# Patient Record
Sex: MALE | Race: WHITE | Employment: OTHER | URBAN - METROPOLITAN AREA
[De-identification: names, ages, dates, MRNs, and addresses within clinical notes are randomized per-mention and may not be internally consistent; named-entity substitution may affect disease eponyms.]

---

## 2017-02-25 DIAGNOSIS — E78.5 HYPERLIPIDEMIA: ICD-10-CM

## 2017-03-01 RX ORDER — PRAVASTATIN SODIUM 10 MG
TABLET ORAL
Qty: 90 TABLET | Refills: 3 | Status: SHIPPED | OUTPATIENT
Start: 2017-03-01 | End: 2017-09-21 | Stop reason: SDUPTHER

## 2017-09-21 ENCOUNTER — OFFICE VISIT (OUTPATIENT)
Dept: INTERNAL MEDICINE CLINIC | Age: 62
End: 2017-09-21
Payer: COMMERCIAL

## 2017-09-21 VITALS
SYSTOLIC BLOOD PRESSURE: 118 MMHG | WEIGHT: 173 LBS | BODY MASS INDEX: 26.22 KG/M2 | DIASTOLIC BLOOD PRESSURE: 72 MMHG | HEIGHT: 68 IN

## 2017-09-21 DIAGNOSIS — Z00.00 ROUTINE MEDICAL EXAM: Primary | ICD-10-CM

## 2017-09-21 PROBLEM — Z85.46 HISTORY OF PROSTATE CANCER: Status: ACTIVE | Noted: 2017-09-21

## 2017-09-21 PROCEDURE — 99396 PREV VISIT EST AGE 40-64: CPT | Performed by: INTERNAL MEDICINE

## 2017-09-21 RX ORDER — PRAVASTATIN SODIUM 10 MG
20 TABLET ORAL DAILY
Qty: 90 TABLET | Refills: 3 | Status: SHIPPED | OUTPATIENT
Start: 2017-09-21 | End: 2017-09-22 | Stop reason: SDUPTHER

## 2017-09-21 RX ORDER — MELOXICAM 15 MG/1
15 TABLET ORAL DAILY
Qty: 90 TABLET | Refills: 1 | Status: SHIPPED | OUTPATIENT
Start: 2017-09-21 | End: 2018-02-16 | Stop reason: SDUPTHER

## 2017-09-21 ASSESSMENT — PATIENT HEALTH QUESTIONNAIRE - PHQ9
2. FEELING DOWN, DEPRESSED OR HOPELESS: 0
SUM OF ALL RESPONSES TO PHQ QUESTIONS 1-9: 0
SUM OF ALL RESPONSES TO PHQ9 QUESTIONS 1 & 2: 0
1. LITTLE INTEREST OR PLEASURE IN DOING THINGS: 0

## 2017-09-22 DIAGNOSIS — E78.2 MIXED HYPERLIPIDEMIA: ICD-10-CM

## 2017-09-22 DIAGNOSIS — M19.90 ARTHRITIS: ICD-10-CM

## 2017-09-22 RX ORDER — PRAVASTATIN SODIUM 20 MG
20 TABLET ORAL DAILY
Qty: 90 TABLET | Refills: 3 | Status: SHIPPED | OUTPATIENT
Start: 2017-09-22 | End: 2018-03-20

## 2017-10-04 ENCOUNTER — TELEPHONE (OUTPATIENT)
Dept: INTERNAL MEDICINE CLINIC | Age: 62
End: 2017-10-04

## 2017-10-24 LAB
ABSOLUTE BASO #: 0.1 K/UL (ref 0–0.1)
ABSOLUTE EOS #: 0.2 K/UL (ref 0.1–0.4)
ABSOLUTE LYMPH #: 1.3 K/UL (ref 0.8–5.2)
ABSOLUTE MONO #: 0.4 K/UL (ref 0.1–0.9)
ABSOLUTE NEUT #: 3 K/UL (ref 1.3–9.1)
BASOPHILS RELATIVE PERCENT: 1.2 %
EOSINOPHILS RELATIVE PERCENT: 4.3 %
HCT VFR BLD CALC: 46.2 % (ref 41.4–51)
HEMOGLOBIN: 15.5 G/DL (ref 13.8–17)
LYMPHOCYTE %: 26.4 %
MCH RBC QN AUTO: 28.4 PG (ref 27–34)
MCHC RBC AUTO-ENTMCNC: 33.5 G/DL (ref 31–36)
MCV RBC AUTO: 84.8 FL (ref 80–100)
MONOCYTES # BLD: 7.9 %
NEUTROPHILS RELATIVE PERCENT: 60 %
PDW BLD-RTO: 12.8 % (ref 10.8–14.8)
PLATELETS: 181 K/UL (ref 150–450)
RBC: 5.45 M/UL (ref 4–5.5)
WBC: 4.9 K/UL (ref 3.7–10.8)

## 2017-10-25 ENCOUNTER — PATIENT MESSAGE (OUTPATIENT)
Dept: INTERNAL MEDICINE CLINIC | Age: 62
End: 2017-10-25

## 2017-10-25 LAB
ALBUMIN SERPL-MCNC: 4.4 G/DL (ref 3.2–5.3)
ALK PHOSPHATASE: 42 IU/L (ref 35–121)
ALT SERPL-CCNC: 17 IU/L (ref 5–59)
ANION GAP SERPL CALCULATED.3IONS-SCNC: 12 MMOL/L
APPEARANCE: CLEAR
AST SERPL-CCNC: 14 IU/L (ref 10–42)
BILIRUB SERPL-MCNC: 0.8 MG/DL (ref 0.2–1.3)
BILIRUBIN: NEGATIVE
BUN BLDV-MCNC: 12 MG/DL (ref 10–20)
CALCIUM SERPL-MCNC: 9.2 MG/DL (ref 8.7–10.8)
CHLORIDE BLD-SCNC: 105 MMOL/L (ref 95–111)
CHOLESTEROL/HDL RATIO: 4.5
CHOLESTEROL: 193 MG/DL
CO2: 29 MMOL/L (ref 21–32)
COLOR: YELLOW
CREAT SERPL-MCNC: 1 MG/DL (ref 0.5–1.3)
EGFR AFRICAN AMERICAN: 92
EGFR IF NONAFRICAN AMERICAN: 76
GLUCOSE BLD-MCNC: NEGATIVE MG/DL
GLUCOSE: 83 MG/DL (ref 70–100)
HDLC SERPL-MCNC: 43 MG/DL (ref 40–60)
KETONES, URINE: NEGATIVE
LDL CHOLESTEROL CALCULATED: 116 MG/DL
LDL/HDL RATIO: 2.7
LEUKOCYTE ESTERASE, URINE: NEGATIVE
NITRITE, URINE: NEGATIVE
OCCULT BLOOD,URINE: NEGATIVE
PH: 7 (ref 5–9)
POTASSIUM SERPL-SCNC: 4.6 MMOL/L (ref 3.5–5.4)
PROTEIN, URINE: NEGATIVE
PSA, ULTRASENSITIVE: 0.95 NG/ML
SODIUM BLD-SCNC: 141 MMOL/L (ref 134–147)
SP GRAVITY MISCELLANEOUS: 1.01 (ref 1–1.03)
TOTAL CK: 69 IU/L (ref 0–236)
TOTAL PROTEIN: 6.7 G/DL (ref 5.8–8)
TRIGL SERPL-MCNC: 171 MG/DL
UROBILINOGEN, URINE: NORMAL
VLDLC SERPL CALC-MCNC: 34 MG/DL

## 2017-10-26 ENCOUNTER — PATIENT MESSAGE (OUTPATIENT)
Dept: INTERNAL MEDICINE CLINIC | Age: 62
End: 2017-10-26

## 2018-01-15 DIAGNOSIS — Z12.11 COLON CANCER SCREENING: ICD-10-CM

## 2018-01-15 DIAGNOSIS — Z12.11 COLON CANCER SCREENING: Primary | ICD-10-CM

## 2018-01-15 LAB
CONTROL: PRESENT
HEMOCCULT STL QL: NEGATIVE

## 2018-01-15 PROCEDURE — 82274 ASSAY TEST FOR BLOOD FECAL: CPT | Performed by: INTERNAL MEDICINE

## 2018-02-16 DIAGNOSIS — Z00.00 ROUTINE MEDICAL EXAM: ICD-10-CM

## 2018-02-21 RX ORDER — MELOXICAM 15 MG/1
TABLET ORAL
Qty: 90 TABLET | Refills: 1 | Status: SHIPPED | OUTPATIENT
Start: 2018-02-21 | End: 2018-10-25 | Stop reason: SDUPTHER

## 2018-03-20 ENCOUNTER — OFFICE VISIT (OUTPATIENT)
Dept: INTERNAL MEDICINE CLINIC | Age: 63
End: 2018-03-20
Payer: COMMERCIAL

## 2018-03-20 VITALS
DIASTOLIC BLOOD PRESSURE: 86 MMHG | SYSTOLIC BLOOD PRESSURE: 153 MMHG | WEIGHT: 174 LBS | BODY MASS INDEX: 26.37 KG/M2 | HEIGHT: 68 IN

## 2018-03-20 DIAGNOSIS — E78.5 HYPERLIPIDEMIA, UNSPECIFIED HYPERLIPIDEMIA TYPE: ICD-10-CM

## 2018-03-20 DIAGNOSIS — H91.93 PARTIAL DEAFNESS OF BOTH EARS: ICD-10-CM

## 2018-03-20 DIAGNOSIS — Z00.00 WELL ADULT EXAM: Primary | ICD-10-CM

## 2018-03-20 DIAGNOSIS — Z00.00 ROUTINE GENERAL MEDICAL EXAMINATION AT A HEALTH CARE FACILITY: ICD-10-CM

## 2018-03-20 DIAGNOSIS — Z85.46 HISTORY OF PROSTATE CANCER: ICD-10-CM

## 2018-03-20 DIAGNOSIS — M19.90 ARTHRITIS: ICD-10-CM

## 2018-03-20 PROCEDURE — 99396 PREV VISIT EST AGE 40-64: CPT | Performed by: INTERNAL MEDICINE

## 2018-03-20 NOTE — PROGRESS NOTES
Visit Information    Have you changed or started any medications since your last visit including any over-the-counter medicines, vitamins, or herbal medicines? no   Are you having any side effects from any of your medications? -  no  Have you stopped taking any of your medications? Is so, why? -  no    Have you seen any other physician or provider since your last visit? No  Have you had any other diagnostic tests since your last visit? No  Have you been seen in the emergency room and/or had an admission to a hospital since we last saw you? No  Have you had your routine dental cleaning in the past 6 months? no    Have you activated your [employer] account? If not, what are your barriers?  Yes     Patient Care Team:  Josue Hernandez MD as PCP - General    Medical History Review  Past Medical, Family, and Social History reviewed and does contribute to the patient presenting condition    Health Maintenance   Topic Date Due    Hepatitis C screen  1955    HIV screen  09/28/1970    DTaP/Tdap/Td vaccine (1 - Tdap) 09/28/1974    Shingles Vaccine (1 of 2 - 2 Dose Series) 09/28/2005    Flu vaccine (1) 09/01/2017    Colon Cancer Screen FIT/FOBT  01/15/2019    Lipid screen  10/24/2022

## 2018-03-22 LAB
ALBUMIN SERPL-MCNC: 4.5 G/DL (ref 3.5–5.2)
ALK PHOSPHATASE: 46 U/L (ref 39–118)
ALT SERPL-CCNC: 18 U/L (ref 5–50)
ANION GAP SERPL CALCULATED.3IONS-SCNC: 12 MEQ/L (ref 10–19)
AST SERPL-CCNC: 15 U/L (ref 9–50)
BILIRUB SERPL-MCNC: 0.6 MG/DL
BUN BLDV-MCNC: 11 MG/DL (ref 8–23)
CALCIUM SERPL-MCNC: 9.2 MG/DL (ref 8.5–10.5)
CHLORIDE BLD-SCNC: 105 MEQ/L (ref 95–107)
CHOLESTEROL/HDL RATIO: 4.8
CHOLESTEROL: 208 MG/DL
CO2: 25 MEQ/L (ref 19–31)
CREAT SERPL-MCNC: 0.9 MG/DL (ref 0.8–1.4)
EGFR AFRICAN AMERICAN: 105.7 ML/MIN/1.73 M2
EGFR IF NONAFRICAN AMERICAN: 91.2 ML/MIN/1.73 M2
GLUCOSE: 85 MG/DL (ref 70–99)
HDLC SERPL-MCNC: 43 MG/DL
LDL CHOLESTEROL CALCULATED: 145 MG/DL
LDL/HDL RATIO: 3.4
POTASSIUM SERPL-SCNC: 4.5 MEQ/L (ref 3.5–5.4)
PSA, ULTRASENSITIVE: 0.84 NG/ML
SODIUM BLD-SCNC: 142 MEQ/L (ref 135–146)
TOTAL PROTEIN: 6.5 G/DL (ref 6.1–8.3)
TRIGL SERPL-MCNC: 102 MG/DL
VLDLC SERPL CALC-MCNC: 20 MG/DL

## 2018-03-23 LAB
ABSOLUTE BASO #: 0 K/UL (ref 0–0.1)
ABSOLUTE EOS #: 0.2 K/UL (ref 0.1–0.4)
ABSOLUTE LYMPH #: 1.1 K/UL (ref 0.8–5.2)
ABSOLUTE MONO #: 0.4 K/UL (ref 0.1–0.9)
ABSOLUTE NEUT #: 1.8 K/UL (ref 1.3–9.1)
AMORPHOUS URATE: NORMAL
APPEARANCE: NORMAL
BACTERIA: NEGATIVE PER HPF
BASOPHILS RELATIVE PERCENT: 1.1 %
BILIRUBIN: NEGATIVE
CALCIUM OXALATE: NORMAL
COLOR: YELLOW
EOSINOPHILS RELATIVE PERCENT: 6.2 %
EPITHELIAL CELLS: 0 PER HPF
GLUCOSE BLD-MCNC: NEGATIVE MG/DL
HCT VFR BLD CALC: 43.9 % (ref 41.4–51)
HEMOGLOBIN: 15.2 G/DL (ref 13.8–17)
KETONES, URINE: NEGATIVE
LEUKOCYTE ESTERASE, URINE: NEGATIVE
LYMPHOCYTE %: 32.3 %
MCH RBC QN AUTO: 29.7 PG (ref 27–34)
MCHC RBC AUTO-ENTMCNC: 34.6 G/DL (ref 31–36)
MCV RBC AUTO: 85.7 FL (ref 80–100)
MONOCYTES # BLD: 10.2 %
NEUTROPHILS RELATIVE PERCENT: 49.9 %
NITRITE, URINE: NEGATIVE
OCCULT BLOOD,URINE: NEGATIVE
PDW BLD-RTO: 12.9 % (ref 10.8–14.8)
PH: 5.5 (ref 5–9)
PLATELETS: 196 K/UL (ref 150–450)
PROTEIN, URINE: NEGATIVE
RBC: 0 PER HPF (ref 0–5)
RBC: 5.12 M/UL (ref 4–5.5)
SP GRAVITY MISCELLANEOUS: 1.02 (ref 1–1.03)
UROBILINOGEN, URINE: NORMAL
WBC: 0 PER HPF (ref 0–5)
WBC: 3.5 K/UL (ref 3.7–10.8)

## 2018-03-31 ENCOUNTER — PATIENT MESSAGE (OUTPATIENT)
Dept: INTERNAL MEDICINE CLINIC | Age: 63
End: 2018-03-31

## 2018-10-25 DIAGNOSIS — Z00.00 ROUTINE MEDICAL EXAM: ICD-10-CM

## 2018-10-25 RX ORDER — MELOXICAM 15 MG/1
15 TABLET ORAL DAILY
Qty: 90 TABLET | Refills: 1 | Status: SHIPPED | OUTPATIENT
Start: 2018-10-25 | End: 2018-10-26 | Stop reason: SDUPTHER

## 2018-10-26 DIAGNOSIS — Z00.00 ROUTINE MEDICAL EXAM: ICD-10-CM

## 2018-10-26 RX ORDER — MELOXICAM 15 MG/1
15 TABLET ORAL DAILY
Qty: 90 TABLET | Refills: 1 | Status: SHIPPED | OUTPATIENT
Start: 2018-10-26 | End: 2019-11-19 | Stop reason: SDUPTHER

## 2019-11-19 DIAGNOSIS — Z00.00 ROUTINE MEDICAL EXAM: ICD-10-CM

## 2019-11-20 RX ORDER — MELOXICAM 15 MG/1
15 TABLET ORAL DAILY
Qty: 30 TABLET | Refills: 0 | Status: SHIPPED | OUTPATIENT
Start: 2019-11-20 | End: 2020-10-15 | Stop reason: SDUPTHER

## 2020-07-13 RX ORDER — MELOXICAM 15 MG/1
15 TABLET ORAL DAILY
Qty: 30 TABLET | Refills: 0 | OUTPATIENT
Start: 2020-07-13

## 2020-07-31 RX ORDER — MELOXICAM 15 MG/1
15 TABLET ORAL DAILY
Qty: 90 TABLET | Refills: 0 | Status: CANCELLED | OUTPATIENT
Start: 2020-07-31

## 2020-10-15 ENCOUNTER — OFFICE VISIT (OUTPATIENT)
Dept: INTERNAL MEDICINE CLINIC | Age: 65
End: 2020-10-15
Payer: MEDICARE

## 2020-10-15 VITALS
OXYGEN SATURATION: 96 % | BODY MASS INDEX: 26.37 KG/M2 | SYSTOLIC BLOOD PRESSURE: 130 MMHG | HEART RATE: 76 BPM | TEMPERATURE: 96 F | DIASTOLIC BLOOD PRESSURE: 84 MMHG | WEIGHT: 174 LBS

## 2020-10-15 PROBLEM — L98.9 SKIN LESION: Status: ACTIVE | Noted: 2020-10-15

## 2020-10-15 PROCEDURE — 99214 OFFICE O/P EST MOD 30 MIN: CPT | Performed by: INTERNAL MEDICINE

## 2020-10-15 RX ORDER — MELOXICAM 15 MG/1
15 TABLET ORAL DAILY
Qty: 90 TABLET | Refills: 1 | Status: ON HOLD | OUTPATIENT
Start: 2020-10-15 | End: 2022-01-11 | Stop reason: HOSPADM

## 2020-10-15 ASSESSMENT — PATIENT HEALTH QUESTIONNAIRE - PHQ9
SUM OF ALL RESPONSES TO PHQ QUESTIONS 1-9: 0
SUM OF ALL RESPONSES TO PHQ9 QUESTIONS 1 & 2: 0
1. LITTLE INTEREST OR PLEASURE IN DOING THINGS: 0
SUM OF ALL RESPONSES TO PHQ QUESTIONS 1-9: 0
2. FEELING DOWN, DEPRESSED OR HOPELESS: 0
SUM OF ALL RESPONSES TO PHQ QUESTIONS 1-9: 0

## 2020-10-15 NOTE — PROGRESS NOTES
OFFICE VISIT  PROGRESS NOTE         Date of patient's visit: 10/21/20  Patient's Name:  Bhavana Hameed  YOB: 1955       Patient Care Team:  Diane Vasquez MD as PCP - Lex Cowden, MD as PCP - [facility] Empaneled Provider        SUBJECTIVE     HISTORY           History of present illness     Pertinent details  added to ,       Chief Complaint   Patient presents with    Annual Exam    Knee Pain     left knee pain, ACL detaiched diagnosed 25 years ago    Other     lump on right hand          Encounter Diagnoses   Name Primary?  Screening for malignant neoplasm of colon Yes    Routine medical exam     Mixed hyperlipidemia     Arthritis     History of prostate cancer     Partial deafness of both ears     Skin lesion rt hand wart     Prostate cancer screening         Symptom / problem          --history obtained from patient.-   Lorna is here for a periodic visit to evaluate his chronic problems   He was diagnosed to have prostate cancer and had radiation therapy and is in remission   He does have partial deafness of both ears which has been stable  He is complaining of left knee pain  History of sprain in the past  He was offered an evaluation by orthopedic he wants to wait  He does take meloxicam for arthritis     MEDICATIONS:      Current Outpatient Medications   Medication Sig Dispense Refill    meloxicam (MOBIC) 15 MG tablet Take 1 tablet by mouth daily 90 tablet 1     No current facility-administered medications for this visit. ALLERGIES:      Allergies   Allergen Reactions    Pravastatin Other (See Comments)     Myalgias         SOCIAL HISTORY   Reviewed and no change from previous record. Emilio Santiago  reports that he has never smoked.  He has never used smokeless tobacco.    FAMILY HISTORY:    Reviewed and No change from previous visit    REVIEW OF SYSTEMS:    Review of Systems   Respiratory: Negative for shortness of breath. Cardiovascular: Negative for chest pain, palpitations and leg swelling. Gastrointestinal: Negative for abdominal pain. Musculoskeletal: Negative for myalgias. Skin: Negative for rash. OBJECTIVE      Physical exam           Vitals:    10/15/20 1438   BP: 130/84   Site: Right Upper Arm   Pulse: 76   Temp: 96 °F (35.6 °C)   SpO2: 96%   Weight: 174 lb (78.9 kg)         Physical Exam   Vitals:  /84 (Site: Right Upper Arm)   Pulse 76   Temp 96 °F (35.6 °C)   Wt 174 lb (78.9 kg)   SpO2 96%   BMI 26.37 kg/m²                 Body mass index is 26.37 kg/m².      Vitals:    10/15/20 1438   BP: 130/84   Site: Right Upper Arm   Pulse: 76   Temp: 96 °F (35.6 °C)   SpO2: 96%   Weight: 174 lb (78.9 kg)       General -alert, well appearing, and in no distress  Skin - normal coloration and turgor, no rashes,no suspicious skin lesions noted  Eyes - pupils equal and reactive, extraocular eye movementsintact  Ears - bilateral TM's and external ear canals normal  Nose - normal and patent, no erythema, discharge or polyps  Mouth - mucous membranes are moist, pharynx normal without lesions  Neck - supple, no significant adenopathy  Lymphatics - no palpable lymphadenopathy, no hepatosplenomegaly    Chest - clear to auscultation, no wheezes, rales or rhonchi, symmetric air entry    Heart - normal rate, regular rhythm, no murmurs, rubs, clicks or gallops    Extremities - peripheral pulses normal, no pedal edema       Abdomen - soft, nontender, nondistended, no masses or organomegaly    Back - full range of motion, notenderness, palpable spasm or pain on motion    Neurological - alert, oriented, normal speech, no focal sensory or motor deficit  Musculoskeletal - no joint tenderness, deformity or swelling        Left knee arthritis        DIAGNOSTICS / INSERTS / IMAGES    [x] Reviewed       Labs      URINE ANALYSIS: No results found for: LABURIN     CBC:  Lab Results   Component Value Date    WBC 3.5 03/22/2018    WBC 0 03/22/2018    WBC 4.3 05/14/2015    HGB 15.2 03/22/2018     03/22/2018     05/14/2015     10/31/2011        BMP:    Lab Results   Component Value Date     10/16/2020    K 4.6 10/16/2020     10/16/2020    CO2 22 10/16/2020    BUN 15 10/16/2020    CREATININE 0.78 10/16/2020    GLUCOSE 84 10/16/2020    GLUCOSE 85 03/22/2018        No components found for: LDLC  No results found for: LABA1C  No results found for: POCGLU   .     LIVER PROFILE:  Lab Results   Component Value Date    ALT 20 10/16/2020    AST 16 10/16/2020    PROT 6.6 10/16/2020    BILITOT 0.46 10/16/2020    BILITOT NEGATIVE 03/22/2018    LABALBU 4.3 10/16/2020    LABALBU 4.6 10/31/2011          Results for orders placed or performed in visit on 03/20/18   Lipid Panel   Result Value Ref Range    Cholesterol 208 (H) <200 mg/dL    Triglycerides 102 <150 mg/dL    HDL 43 >39 mg/dL    LDL Calculated 145 (H) <130 mg/dL    VLDL Cholesterol Calculated 20 <30 mg/dL    LDl/HDL Ratio 3.4 <3.5    Chol/HDL Ratio 4.8 <5   Comprehensive Metabolic Panel   Result Value Ref Range    Glucose 85 70 - 99 mg/dL    BUN 11 8 - 23 mg/dL    CREATININE 0.9 0.8 - 1.4 mg/dL    eGFR African American 105.7 >59 mL/min/1.73 m2    EGFR IF NonAfrican American 91.2 >59 mL/min/1.73 m2    Calcium 9.2 8.5 - 10.5 mg/dL    Sodium 142 135 - 146 mEq/L    Potassium 4.5 3.5 - 5.4 mEq/L    Chloride 105 95 - 107 mEq/L    CO2 25 19 - 31 mEq/L    Anion Gap 12 10 - 19 mEq/L    Total Bilirubin 0.6 <1.3 mg/dL    Alk Phosphatase 46 39 - 118 U/L    AST 15 9 - 50 U/L    ALT 18 5 - 50 U/L    Total Protein 6.5 6.1 - 8.3 g/dL    Alb 4.5 3.5 - 5.2 g/dL   CBC   Result Value Ref Range    WBC 3.5 (L) 3.7 - 10.8 K/ul    RBC 5.12 4.00 - 5.50 M/ul    Hemoglobin 15.2 13.8 - 17.0 g/dL    Hematocrit 43.9 41.4 - 51.0 % deafness of both ears  Stable  Skin lesion rt hand wart  Is a small viral wart right hand  Prostate cancer screening  -     Psa screening; Future               SALIENT  ACTIONS TODAYS VISIT       Today's office visit SALIENT ACTIONS    ----            Discussed use, benefit, and side effects of prescribed medications. [x] yes    All patient questions answered. Patient voiced understanding. Patient given educational materials - see patient instructions  [] yes         Medications Discontinued During This Encounter   Medication Reason    meloxicam (MOBIC) 15 MG tablet REORDER        Orders Placed This Encounter   Procedures    Cologuard (For External Results Only)     This test is performed by an external laboratory and is used for result attachment only. It is required that this order requisition be faxed to: [facility] @ [phone]. See [email or web address] for further information. Standing Status:   Future     Standing Expiration Date:   10/15/2021    Comprehensive Metabolic Panel     Standing Status:   Future     Number of Occurrences:   1     Standing Expiration Date:   10/15/2021    Lipid Panel     Standing Status:   Future     Number of Occurrences:   1     Standing Expiration Date:   10/15/2021     Order Specific Question:   Is Patient Fasting?/# of Hours     Answer:   8    Psa screening     Standing Status:   Future     Number of Occurrences:   1     Standing Expiration Date:   10/15/2021    Urinalysis     Standing Status:   Future     Number of Occurrences:   1     Standing Expiration Date:   10/15/2021   Claudis Peabody, MD, Orthopedic Surgery, [address] [place] [address]     Referral Priority:   Routine     Referral Type:   Eval and Treat     Referral Reason:   Specialty Services Required     Referred to Provider:   Destiny Purcell MD     Requested Specialty:   Orthopedic Surgery     Number of Visits Requested:   1        There are no Patient Instructions on file for this visit. Medication List          Accurate as of October 15, 2020 11:59 PM. If you have any questions, ask your nurse or doctor. CONTINUE taking these medications    meloxicam 15 MG tablet  Commonly known as:  MOBIC  Take 1 tablet by mouth daily           Where to Get Your Medications      These medications were sent to [facility]  [address] [place] [address]    Phone:  [phone] ·   meloxicam 15 MG tablet             FOLLOW UP  PLANS     No follow-ups on file. MD QUIN Flanagan [facility]  [address] Phone [phone]   Fax: [phone]  Answering Service: [phone]  Visit Information    Have you changed or started any medications since your last visit including any over-the-counter medicines, vitamins, or herbal medicines? no   Are you having any side effects from any of your medications? -  no  Have you stopped taking any of your medications? Is so, why? -  no    Have you seen any other physician or provider since your last visit? No  Have you had any other diagnostic tests since your last visit? No  Have you been seen in the emergency room and/or had an admission to a hospital since we last saw you? No  Have you had your routine dental cleaning in the past 6 months? yes -     Have you activated your [employer] account? If not, what are your barriers?  Yes     Patient Care Team:  Rasheed Weaver MD as PCP - Julianna David MD as PCP - [facility] Provider    Medical History Review  Past Medical, Family, and Social History reviewed and does not contribute to the patient presenting condition    Health Maintenance   Topic Date Due    Hepatitis C screen  1955    HIV screen  09/28/1970    DTaP/Tdap/Td vaccine (1 - Tdap) 09/28/1974    Shingles Vaccine (1 of 2) 09/28/2005    Colon Cancer Screen FIT/FOBT  01/15/2019    PSA counseling  03/22/2019    Flu vaccine (1) 09/01/2020    Pneumococcal 65+ years Vaccine (1 of 1 - PPSV23) 09/28/2020    Lipid screen  03/22/2023    Hepatitis A vaccine  Aged Out    Hepatitis B vaccine  Aged Out    Hib vaccine  Aged Out    Meningococcal (ACWY) vaccine  Aged Out

## 2020-10-16 ENCOUNTER — HOSPITAL ENCOUNTER (OUTPATIENT)
Age: 65
Setting detail: SPECIMEN
Discharge: HOME | End: 2020-10-16
Payer: MEDICARE

## 2020-10-16 LAB
ALBUMIN SERPL-MCNC: 4.3 G/DL (ref 3.5–5.2)
ALBUMIN/GLOBULIN RATIO: 1.9 (ref 1–2.5)
ALP BLD-CCNC: 45 U/L (ref 40–129)
ALT SERPL-CCNC: 20 U/L (ref 5–41)
ANION GAP SERPL CALCULATED.3IONS-SCNC: 14 MMOL/L (ref 9–17)
AST SERPL-CCNC: 16 U/L
BILIRUB SERPL-MCNC: 0.46 MG/DL (ref 0.3–1.2)
BILIRUBIN URINE: NEGATIVE
BUN BLDV-MCNC: 15 MG/DL (ref 8–23)
BUN/CREAT BLD: NORMAL (ref 9–20)
CALCIUM SERPL-MCNC: 9.1 MG/DL (ref 8.6–10.4)
CHLORIDE BLD-SCNC: 105 MMOL/L (ref 98–107)
CHOLESTEROL/HDL RATIO: 4.3
CHOLESTEROL: 203 MG/DL
CO2: 22 MMOL/L (ref 20–31)
COLOR: YELLOW
COMMENT UA: NORMAL
CREAT SERPL-MCNC: 0.78 MG/DL (ref 0.7–1.2)
GFR AFRICAN AMERICAN: >60 ML/MIN
GFR NON-AFRICAN AMERICAN: >60 ML/MIN
GFR SERPL CREATININE-BSD FRML MDRD: NORMAL ML/MIN/{1.73_M2}
GFR SERPL CREATININE-BSD FRML MDRD: NORMAL ML/MIN/{1.73_M2}
GLUCOSE BLD-MCNC: 84 MG/DL (ref 70–99)
GLUCOSE URINE: NEGATIVE
HDLC SERPL-MCNC: 47 MG/DL
KETONES, URINE: NEGATIVE
LDL CHOLESTEROL: 132 MG/DL (ref 0–130)
LEUKOCYTE ESTERASE, URINE: NEGATIVE
NITRITE, URINE: NEGATIVE
PH UA: 8 (ref 5–8)
POTASSIUM SERPL-SCNC: 4.6 MMOL/L (ref 3.7–5.3)
PROSTATE SPECIFIC ANTIGEN: 0.75 UG/L
PROTEIN UA: NEGATIVE
SODIUM BLD-SCNC: 141 MMOL/L (ref 135–144)
SPECIFIC GRAVITY UA: 1.01 (ref 1–1.03)
TOTAL PROTEIN: 6.6 G/DL (ref 6.4–8.3)
TRIGL SERPL-MCNC: 122 MG/DL
TURBIDITY: CLEAR
URINE HGB: NEGATIVE
UROBILINOGEN, URINE: NORMAL
VLDLC SERPL CALC-MCNC: ABNORMAL MG/DL (ref 1–30)

## 2020-10-21 ASSESSMENT — ENCOUNTER SYMPTOMS
SHORTNESS OF BREATH: 0
ABDOMINAL PAIN: 0

## 2020-10-27 ENCOUNTER — OFFICE VISIT (OUTPATIENT)
Dept: ORTHOPEDIC SURGERY | Age: 65
End: 2020-10-27
Payer: MEDICARE

## 2020-10-27 VITALS — BODY MASS INDEX: 25.01 KG/M2 | TEMPERATURE: 97.3 F | HEIGHT: 68 IN | WEIGHT: 165 LBS

## 2020-10-27 PROCEDURE — 20610 DRAIN/INJ JOINT/BURSA W/O US: CPT | Performed by: ORTHOPAEDIC SURGERY

## 2020-10-27 PROCEDURE — 99203 OFFICE O/P NEW LOW 30 MIN: CPT | Performed by: ORTHOPAEDIC SURGERY

## 2020-10-27 RX ORDER — LIDOCAINE HYDROCHLORIDE 10 MG/ML
4 INJECTION, SOLUTION INFILTRATION; PERINEURAL ONCE
Status: COMPLETED | OUTPATIENT
Start: 2020-10-27 | End: 2020-10-27

## 2020-10-27 RX ORDER — TRIAMCINOLONE ACETONIDE 40 MG/ML
40 INJECTION, SUSPENSION INTRA-ARTICULAR; INTRAMUSCULAR ONCE
Status: COMPLETED | OUTPATIENT
Start: 2020-10-27 | End: 2020-10-27

## 2020-10-27 RX ADMIN — LIDOCAINE HYDROCHLORIDE 4 ML: 10 INJECTION, SOLUTION INFILTRATION; PERINEURAL at 15:40

## 2020-10-27 RX ADMIN — TRIAMCINOLONE ACETONIDE 40 MG: 40 INJECTION, SUSPENSION INTRA-ARTICULAR; INTRAMUSCULAR at 15:40

## 2020-10-27 NOTE — LETTER
10/27/2020    Carol Fox [address]  [address] [facility], [address] [place] [address]    RE: Tonny Lanier    Dear Dr. Susan Ivy,    Thank you for allowing me to participate in the care of Mr. Madai Snyder. I had the opportunity to evaluate the patient on 10/27/2020. Attached you will find my evaluation and recommendations. Thanks again for the confidence you have expressed in me by allowing my participation in the care of your patient. I will keep you apprised of further developments in the patients treatment course as it progresses. If I can be of further assistance in any fashion, please feel free to contact me at your convenience.     Sincerely,        Yoana Araujo  Shoulder and Elbow Surgery

## 2020-10-27 NOTE — PROGRESS NOTES
Orthopedic Knee Encounter Note     Chief complaint: Left knee pain    HPI: Tonny Lanier is a 72 y.o. male who presents for evaluation of his left knee. About 25 to 30 years ago indicates that he tore his ACL. He was given the option of having a reconstruction or not and he chose not to have a reconstruction but he did have his knee scope and the meniscal tear addressed at that time. Ever since he states that he has had some instability in this knee but it appears that it is worsened over the course of the past several years. He remains very active hiking, walking, riding offroad motorcycles as well as participating in competitive shooting and with all these activities he does have pain as well as instability in his knee. He has a hard time localizing his knee pain indicating that it is diffuse. It does not radiate and it is not associated with any weakness but he does describe having some recurrent swelling in the knee. He also denies having any stiffness at this time. Previous treatment:    NSAIDs: Mobic    Injections:  None    Physical therapy: Not recently    Surgeries: History of the left knee arthroscopy with meniscal repair versus meniscectomy approximately 25 years ago. He also recalls having the same knee scoped by Dr. Gladys Tapia several years ago. Review of Systems:     Constitution: no fever or chills   Pain level: 8/10  Musculoskeletal: As noted in the HPI   Neurologic: no neurologic symptoms    Past Medical History  Jaky Mcghee  has no past medical history on file. Past Surgical History  Jaky Mcghee  has no past surgical history on file. Current Medications  Current Outpatient Medications   Medication Sig Dispense Refill    meloxicam (MOBIC) 15 MG tablet Take 1 tablet by mouth daily 90 tablet 1     No current facility-administered medications for this visit. Allergies  Allergies have been reviewed. Jaky Mcghee is allergic to pravastatin.     Social History  Jaky Mcghee  reports that he has never smoked. He has never used smokeless tobacco.    Family History  Suleman's family history is not on file. Physical Exam:     Temp 97.3 °F (36.3 °C) (Infrared)   Ht 5' 8\" (1.727 m)   Wt 165 lb (74.8 kg)   BMI 25.09 kg/m²    General Appearance: alert, well appearing, and in no distress  Mental Status: alert, oriented to person, place, and time  Gait: antalgic  Hips: Good pain-free ROM without crepitation    Knee: Bilateral    Skin: warm and dry, no rash or erythema  Vasculature: 2+ pedal pulses bilaterally  Neuro: Sensation grossly intact to light touch diffusely  Alignment: Varus alignment of the left knee  Tenderness: Nontender to palpation    ROM: (Degrees)    Right   A P   Left   A P    Extension  0    Extension  0   Flexion   135    Flexion   135      Crepitation  No    Crepitation  Yes      Muscle strength:    Right       Left    Flexion   5    Flexion   5  Extension  5    Extension  5  SLR   5    SLR   5    Extensor lag   n    Extensor lag  n      Special testing:    Right          Left    n    Pain with deep knee flexion   n  n    Patellar grind     n  n    Patellar apprehension    n  n    Patellar glide     n    n    Lachman     y  n    Anterior drawer    y  n    Pivot shift     y  n    Posterior drawer    n  n    Dial test     n  n    Posterolateral drawer    n  n    Posterior Sag     n  n    MCL      n  n    LCL      n    n    Medial joint line tenderness   n  n    Lateral joint line tenderness   n  n    Appley's     n    Imaging:  Xrays: 4 views of the left knee obtained on 10/27/2020 were independently reviewed   Indications: Left knee pain  Findings: Near complete medial compartment joint space loss on AP view with complete medial compartment joint space loss on flexion PA view with associated osteophytic changes. No obvious fracture, dislocation, or subluxation. Impression: Left knee with severe degeneration involving the medial compartment of the joint.       Impression/Plan:     Bhargav Quant is a 72 y.o. old male with left knee pain and instability due to underlying osteoarthritis and a chronic ACL tear. I had a discussion with the patient today educating him about this issue and discussed treatment options available to him including nonoperative and operative intervention. I recommended proceeding conservatively at this time and following our discussions he is elected to proceed with a cortisone injection in addition to physical therapy. Injection was administered as outlined below and prescription for therapy provided. I anticipate improvement with this treatment and so I will have him follow-up as needed but he may return or call with persistent or worsening symptoms and with any questions and or concerns. Procedure: left intraarticular knee injection  Following an appropriate discussion with the patient regarding the risks and benefits of the procedure he consented to proceed. his left knee was prepped using chlorhexadine solution. Using aseptic technique and through a superolateral approach, his left knee joint was injected with a 5 cc mixture of 1cc 40mg/ml kenalog and 4 cc of 1% lidocaine without epinephrine. A band aid was applied to the injection site. he tolerated the injection with no immediate adverse reactions.         NA = Not assessed  n = No  y = Yes  SLR = Straight leg raise  MCL = Medial collateral ligament  LCL = Lateral collateral ligament

## 2020-11-05 ENCOUNTER — HOSPITAL ENCOUNTER (OUTPATIENT)
Dept: PHYSICAL THERAPY | Age: 65
Setting detail: THERAPIES SERIES
Discharge: HOME | End: 2020-11-05
Payer: MEDICARE

## 2020-11-05 PROCEDURE — 97110 THERAPEUTIC EXERCISES: CPT

## 2020-11-05 PROCEDURE — 97161 PT EVAL LOW COMPLEX 20 MIN: CPT

## 2020-11-05 NOTE — PROGRESS NOTES
Physical Therapy  Initial Assessment  Date: 2020  Patient Name: Mehdi Young  MRN: [id number]  : 1955     Treatment Diagnosis: Impaired mobilty    Restrictions  Restrictions/Precautions  Required Braces or Orthoses?: Yes  Required Braces or Orthoses  Left Lower Extremity Brace: Knee Brace  LLE Brace Type: hinged- wears during strenuous activity  Position Activity Restriction  Other position/activity restrictions: hx of prostate cancer with radiation    Subjective   General  Referring Practitioner: Ariel Nunez  Referral Date : 20  Diagnosis: L knee OA  Follows Commands: Within Functional Limits  Other (Comment): Order for L Quad, abd, ER strengthening and core strengthening, HEP  General Comment  Comments: Had a cortisone injection 10/27/20  PT Visit Information  Onset Date: (30 years ago playing basketball)  PT Insurance Information: [employer]na Medicare($40 copay/visit)  Total # of Visits Approved: 8(to start)  Total # of Visits to Date: 1  Subjective  Subjective: Tore L ACL 30 years ago. No surgery at the time. Now I have arthritis in the knee. Hoping to avoid surgery if possible. Pain increases with twisting the knee suddenly or exercise. Pain Screening  Patient Currently in Pain: Other (comment)(increases with walking or if I slip and twist it. [place])  Vital Signs  Patient Currently in Pain: Other (comment)(increases with walking or if I slip and twist it. [place]miten)    Vision/Hearing  Vision  Vision: Impaired(full time glassses)  Hearing  Hearing: Exceptions to [facility]  Hearing Exceptions: Bilateral hearing aid    Orientation  Orientation  Overall Orientation Status: Within Functional Limits    Social/Functional History  Social/Functional History  Education: 3 way SLR standing with blue T band x 10; squats with blue theraband  Occupation: Retired  Leisure & Hobbies: walking 1.5 miles a day, off road motorcyles, shooting, sprinting, very active.      Objective     Observation/Palpation  Posture: Good  Palpation: not specifically tender \"pain feels deep\"    AROM RLE (degrees)  RLE AROM: WFL  AROM LLE (degrees)  LLE AROM : WFL  LLE General AROM: squat ~90*    Strength RLE  Strength RLE: WFL  Strength LLE  Strength LLE: WFL        Sensation  Overall Sensation Status: WFL                Balance  Comments: LEFS 54/80  Exercises  Exercise 1: HEP- squats and 3 way SLR blue band x 10 leigh  Exercise 2: L ONLY 6\" step taps F/L x 10 ea     Assessment   Conditions Requiring Skilled Therapeutic Intervention  Body structures, Functions, Activity limitations: Decreased ADL status; Increased pain  Assessment: 72 yr old active male with exacerbation of L knee pain with increased activity level. Hx of torn L ACL that was not repaired now has arthritis in the knee. He would benefit from skilled therapy for education in HEP for core and L LE strengthining to avoid/postpone surgery  Treatment Diagnosis: Impaired mobilty  Prognosis: Good  Decision Making: Low Complexity  History: prostate cancer  Exam: ROM, LEFS, MMT, Balance, palpation  Clinical Presentation: stable  Treatment Initiated : HEP and LLE strengthening  Activity Tolerance  Activity Tolerance: Patient Tolerated treatment well       OutComes Score  LEFS Total Score: 54 (11/05/20 1154)    Goals  Short term goals  Time Frame for Short term goals: 8 visits  Short term goal 1: Indep HEP for DLS and L knee strengthening  Short term goal 2: Improve LEFS to >60/80     Patient Goals: \"Be able to stay active. \"    Comments/Assessment:    Rehab Potential:  [x] Good  [] Fair  [] Poor   Suggested Professional Referral:  [x] No  [] Yes:  Barriers to Goal Achievement:  [x] No  [] Yes:  Domestic Concerns:  [x] No  [] Yes:    Treatment Plan:  [x] Therapeutic Exercise   64231  [] Iontophoresis: 4 mg/mL Dexamethasone Sodium Phosphate  mAmin  37129   [] Therapeutic Activity  16326 [] Vasopneumatic cold with compression  01697    [] Gait Training   74449 [] Ultrasound   21087   [x] Neuromuscular Re-education  [id number] [] Electrical Stimulation Unattended  [id number]   [] Manual Therapy  77352 [] Electrical Stimulation Attended  [id number]   [x] Instruction in HEP  [] Lumbar/Cervical Traction  [id number]   [] Aquatic Therapy   [id number] [] Cold/hotpack    [] Massage   [id number]      [] Dry Needling, 1 or 2 muscles  63471   [] Biofeedback, first 15 minutes   17364  [] Biofeedback, additional 15 minutes   39635 [] Dry Needling, 3 or more muscles  00639      Frequency:    2       X/wk x     4     wk's      [x] Plans/Goals, Risk/Benefits discussed with pt/family  Comprehension of Education [x] yes  [] Needs Review  Pt/Family Education: [x] Verbal  [x] Demo  [x] Written    More objective information is available upon request.  Thank you for this referral.        Medicare/Regulatory Requirements:  I have reviewed this plan of care and certify a need for   Medically necessary rehabilitation services. [x] Physician Signature    Date:     Electronically signed by: Danial Trujillo, [address] [facility] [address] [facility]  [address] [place] [address], [address] [facility]  Phone [phone]  Fax [phone]    Therapy Time   Individual Concurrent Group Co-treatment   Time In 1110         Time Out 1150         Minutes 40             Eval/ex.     Danial Trujillo, PT

## 2020-11-09 ENCOUNTER — TELEPHONE (OUTPATIENT)
Dept: INTERNAL MEDICINE CLINIC | Age: 65
End: 2020-11-09

## 2020-11-09 NOTE — TELEPHONE ENCOUNTER
Was with a positive person on 10/30/20. He did have symptoms a couple of days after that. He does have a cough that is somewhat persistent. His did have a low grade fever but not for 5 days now. Everything smells differently now.     Do you recommend he get tested for COVID or please advise

## 2020-11-13 ENCOUNTER — APPOINTMENT (OUTPATIENT)
Dept: PHYSICAL THERAPY | Age: 65
End: 2020-11-13
Payer: MEDICARE

## 2020-11-16 ENCOUNTER — HOSPITAL ENCOUNTER (OUTPATIENT)
Dept: PHYSICAL THERAPY | Age: 65
Setting detail: THERAPIES SERIES
Discharge: HOME | End: 2020-11-16
Payer: MEDICARE

## 2020-11-16 PROCEDURE — 97110 THERAPEUTIC EXERCISES: CPT

## 2020-11-16 NOTE — PROGRESS NOTES
Physical Therapy  Progress Note Update/Discharge Summary    Date: 2020  Patient Name: Desi Bah  MRN: [id number]  : 1955     Treatment Diagnosis: Impaired mobility    Restrictions  Restrictions/Precautions  Required Braces or Orthoses?: Yes  Required Braces or Orthoses  Left Lower Extremity Brace: Knee Brace  LLE Brace Type: hinged- wears during strenuous activity  Position Activity Restriction  Other position/activity restrictions: hx of prostate cancer with radiation    General  Chart Reviewed: Yes  Patient assessed for rehabilitation services?: Yes  Response To Previous Treatment: Not applicable  Family / Caregiver Present: No  Referring Practitioner: Savanah Villegas  Referral Date : 20  Diagnosis: L knee OA  Follows Commands: Within Functional Limits  Other (Comment): Order for L Quad, abd, ER strengthening and core strengthening, HEP  PT Visit Information  Onset Date: (30 years ago)  PT Insurance Information: [employer] Medicare  Total # of Visits Approved: 8  Total # of Visits to Date: 2  No Show: 0  Canceled Appointment: 0    Subjective  Patient stated that he has been performing his home exercise program provided following his evaluation. He has not been doing them as much as he should. He desired more exercises and wished to make today his last treatment session due to his large co-pay. Pain Screening  Patient Currently in Pain: No  Vision/Hearing  Vision  Vision: Impaired(Glasses)  Hearing  Hearing: Exceptions to [facility]  Hearing Exceptions: Bilateral hearing aid  Orientation  Overall Orientation Status: Within Normal Limits    Objective  Observation/Palpation  Palpation: No tenderness to the touch was reported within the (L) knee. Patient felt the pain injection has helped significantly with his pain reduction.   Range of Motion  AROM RLE (degrees)  RLE AROM: WFL  AROM LLE (degrees)  LLE AROM : WFL  Strength  Strength RLE  Strength RLE: WFL  Strength LLE  Strength LLE: WFL  Sensation  Overall Sensation Status: WFL    Assessment  Conditions Requiring Skilled Therapeutic Intervention  Body structures, Functions, Activity limitations: Decreased ADL status  Treatment Diagnosis: Impaired mobility  Prognosis: Good  Treatment Initiated : Pt was provided a written home exercise program. Offered to review the new exercises with the patient. However, he stated that was comfortable with them/not necessary. Outcomes Score  LEFS Total Score: 59 (11/16/20 1115)    Goals  Short term goals  Time Frame for Short term goals: 8 visits  Short term goal 1: Indep HEP for DLS and L knee strengthening (Met)  Short term goal 2: Improve LEFS to >60/80 (Not Met)    Patient demonstrated improvement from condition with _1_ of_2__ short term goals     Comments/Function: Improved function noted per the self-reported LEFS (+5 points). However, not clinically significant. PT Education: [x] Home Exercise Program  Comprehension [x] Good [] Fair [] Poor  [x] Plans/Goals developed/discussed with pt/family [] Other    Recommendations: Discontinue physical therapy at this time per the patient request with a written home exercise program.     [x] Patient is Discharged At This Time Unless Further Orders Are Received. New Script Needed To Continue Treatment: [x] No   [] Yes  (visits left on current prescription:       6        ) Please sign orders at the bottom of this page and return by faxing. Thank you.      Current Treatment:  [x] Therapeutic Exercise    [] Modalities                [] Work Conditioning  [] Therapeutic Activity    [] Ultrasound  [] Electrical Stimulation  [] Gait Training     [] Massage       [] Lumbar/Cervical Traction  [] Neuromuscular Re-education [] Cold/hot pack [] Iontophoresis: 4 mg/mL  [] Instruction in Home Exercise Program                     Dexamethasone Sodium  [] Manual Therapy             Phosphate 40-80 mA min  [] Aquatic Therapy                                 [] Vaso Pneumatic compression  [] Other:  More objective information is available upon request.                      Physical Therapy Prescription:      [] Continue current Rx    [] Therapist Discretion    [] Rx as below  Recommended Changes to Treatment:  [] Heat/Cold  [] Stretching  [] Therapeutic Exercise  [] Reconditioning  [] Massage  [] Ultrasound  [] Electrical Stim  [] Iontophoresis: 40 mg/ml Dexamethasone Sodium Phosphate 40-80 mA min  [] Aquatics  [] Vasocompression  [] Other:  Frequency:          X/wk for          wks    Medicare/Regulatory Requirements:  I have reviewed this plan of care and certify a need for medically necessary rehabilitation services.   [] Physician Signature                                                   Date:       Thank you for your referral                    Electronically signed by: Emilie Kwon, PT DPT    [facility]) @ [address]  Phone [phone]  Fax [phone]    Therapy Time   Individual Concurrent Group Co-treatment   Time In 0103         Time Out 1230         Minutes 15           Treatment Charges: Minutes Units   []  Ultrasound     []  Electrical-Stim     []  Iontophoresis     []  Traction     []  Massage       []  Eval     []  Gait     [x]  Ther Exercise 15  1    []  Manual Therapy       []  Ther Activities       []  Aquatics     []  Vasopneumatic Device     []  Neuro Re-Ed       []  Other       Total Treatment Time: 15 1

## 2020-11-19 ENCOUNTER — APPOINTMENT (OUTPATIENT)
Dept: PHYSICAL THERAPY | Age: 65
End: 2020-11-19
Payer: MEDICARE

## 2020-11-23 ENCOUNTER — APPOINTMENT (OUTPATIENT)
Dept: PHYSICAL THERAPY | Age: 65
End: 2020-11-23
Payer: MEDICARE

## 2020-11-25 ENCOUNTER — APPOINTMENT (OUTPATIENT)
Dept: PHYSICAL THERAPY | Age: 65
End: 2020-11-25
Payer: MEDICARE

## 2021-11-23 ENCOUNTER — OFFICE VISIT (OUTPATIENT)
Dept: INTERNAL MEDICINE CLINIC | Age: 66
End: 2021-11-23
Payer: MEDICARE

## 2021-11-23 VITALS
SYSTOLIC BLOOD PRESSURE: 134 MMHG | WEIGHT: 185.4 LBS | DIASTOLIC BLOOD PRESSURE: 82 MMHG | BODY MASS INDEX: 28.1 KG/M2 | HEIGHT: 68 IN

## 2021-11-23 DIAGNOSIS — Z00.00 MEDICARE ANNUAL WELLNESS VISIT, SUBSEQUENT: ICD-10-CM

## 2021-11-23 DIAGNOSIS — H43.9 VITREOUS DEBRIS: ICD-10-CM

## 2021-11-23 DIAGNOSIS — H91.93 PARTIAL DEAFNESS OF BOTH EARS: ICD-10-CM

## 2021-11-23 DIAGNOSIS — Z85.46 HISTORY OF PROSTATE CANCER: ICD-10-CM

## 2021-11-23 DIAGNOSIS — Z00.00 ROUTINE GENERAL MEDICAL EXAMINATION AT A HEALTH CARE FACILITY: ICD-10-CM

## 2021-11-23 DIAGNOSIS — M19.90 ARTHRITIS: Primary | ICD-10-CM

## 2021-11-23 PROCEDURE — 99213 OFFICE O/P EST LOW 20 MIN: CPT | Performed by: INTERNAL MEDICINE

## 2021-11-23 PROCEDURE — G0438 PPPS, INITIAL VISIT: HCPCS | Performed by: INTERNAL MEDICINE

## 2021-11-23 SDOH — ECONOMIC STABILITY: FOOD INSECURITY: WITHIN THE PAST 12 MONTHS, THE FOOD YOU BOUGHT JUST DIDN'T LAST AND YOU DIDN'T HAVE MONEY TO GET MORE.: NEVER TRUE

## 2021-11-23 SDOH — ECONOMIC STABILITY: FOOD INSECURITY: WITHIN THE PAST 12 MONTHS, YOU WORRIED THAT YOUR FOOD WOULD RUN OUT BEFORE YOU GOT MONEY TO BUY MORE.: NEVER TRUE

## 2021-11-23 ASSESSMENT — LIFESTYLE VARIABLES
AUDIT-C TOTAL SCORE: 3
HOW OFTEN DURING THE LAST YEAR HAVE YOU HAD A FEELING OF GUILT OR REMORSE AFTER DRINKING: 0
HOW OFTEN DURING THE LAST YEAR HAVE YOU NEEDED AN ALCOHOLIC DRINK FIRST THING IN THE MORNING TO GET YOURSELF GOING AFTER A NIGHT OF HEAVY DRINKING: 0
HOW OFTEN DO YOU HAVE SIX OR MORE DRINKS ON ONE OCCASION: 0
HAS A RELATIVE, FRIEND, DOCTOR, OR ANOTHER HEALTH PROFESSIONAL EXPRESSED CONCERN ABOUT YOUR DRINKING OR SUGGESTED YOU CUT DOWN: 0
HAVE YOU OR SOMEONE ELSE BEEN INJURED AS A RESULT OF YOUR DRINKING: 0
HOW OFTEN DURING THE LAST YEAR HAVE YOU FOUND THAT YOU WERE NOT ABLE TO STOP DRINKING ONCE YOU HAD STARTED: 0
HOW OFTEN DO YOU HAVE A DRINK CONTAINING ALCOHOL: 3
HOW MANY STANDARD DRINKS CONTAINING ALCOHOL DO YOU HAVE ON A TYPICAL DAY: 0
AUDIT TOTAL SCORE: 3
HOW OFTEN DURING THE LAST YEAR HAVE YOU FAILED TO DO WHAT WAS NORMALLY EXPECTED FROM YOU BECAUSE OF DRINKING: 0
HOW OFTEN DURING THE LAST YEAR HAVE YOU BEEN UNABLE TO REMEMBER WHAT HAPPENED THE NIGHT BEFORE BECAUSE YOU HAD BEEN DRINKING: 0

## 2021-11-23 ASSESSMENT — PATIENT HEALTH QUESTIONNAIRE - PHQ9
SUM OF ALL RESPONSES TO PHQ9 QUESTIONS 1 & 2: 0
2. FEELING DOWN, DEPRESSED OR HOPELESS: 0
SUM OF ALL RESPONSES TO PHQ QUESTIONS 1-9: 0
SUM OF ALL RESPONSES TO PHQ QUESTIONS 1-9: 0
1. LITTLE INTEREST OR PLEASURE IN DOING THINGS: 0
SUM OF ALL RESPONSES TO PHQ QUESTIONS 1-9: 0

## 2021-11-23 ASSESSMENT — SOCIAL DETERMINANTS OF HEALTH (SDOH): HOW HARD IS IT FOR YOU TO PAY FOR THE VERY BASICS LIKE FOOD, HOUSING, MEDICAL CARE, AND HEATING?: NOT HARD AT ALL

## 2021-11-23 NOTE — PROGRESS NOTES
Medicare Annual Wellness Visit  Name: Keny Dawson Date: 2021   MRN: [id number] Sex: Male   Age: 77 y.o. Ethnicity: Non- / Non    : 1955 Race: White (non-)      Davis Montanez is here for Medicare AWV    Screenings for behavioral, psychosocial and functional/safety risks, and cognitive dysfunction are all negative except as indicated below. These results, as well as other patient data from the [address] form, are documented in Flowsheets linked to this Encounter. Allergies   Allergen Reactions    Pravastatin Other (See Comments)     Myalgias         Prior to Visit Medications    Medication Sig Taking? Authorizing Provider   meloxicam (MOBIC) 15 MG tablet Take 1 tablet by mouth daily Yes Mumtaz Mackey MD   COVID-19 Antibody Test KIT 1 kit by In Vitro route once for 1 dose  Mumtaz Mackey MD     No past medical history on file. No past surgical history on file. No family history on file. CareTeam (Including outside providers/suppliers regularly involved in providing care):   Patient Care Team:  Mumtaz Mackey MD as PCP - Alejandro Nuñez MD as PCP - [facility] Empaneled Provider    Wt Readings from Last 3 Encounters:   21 185 lb 6.4 oz (84.1 kg)   10/27/20 165 lb (74.8 kg)   10/15/20 174 lb (78.9 kg)     Vitals:    21 1435   BP: 134/82   Site: Left Upper Arm   Position: Sitting   Weight: 185 lb 6.4 oz (84.1 kg)   Height: 5' 8\" (1.727 m)     Body mass index is 28.19 kg/m². Based upon direct observation of the patient, evaluation of cognition reveals recent and remote memory intact. Physical Exam     Patient's complete Health Risk Assessment and screening values have been reviewed and are found in Flowsheets. The following problems were reviewed today and where indicated follow up appointments were made and/or referrals ordered.     Positive Risk Factor Screenings with Interventions:                 General Health and ACP:  General  In general, how would you say your health is?: Very Good  In the past 7 days, have you experienced any of the following? New or Increased Pain, New or Increased Fatigue, Loneliness, Social Isolation, Stress or Anger?: None of These  Do you get the social and emotional support that you need?: Yes  Do you have a Living Will?:  (working on it)  [address] [place] [address] ACP-Advance Directive ACP-Power of     Not on File Not on File Not on File Not on File          Health Habits/Nutrition:  Health Habits/Nutrition  Do you exercise for at least 20 minutes 2-3 times per week?: Yes  Have you lost any weight without trying in the past 3 months?: No  Do you eat only one meal per day?: No  Have you seen the dentist within the past year?: (!) No  Body mass index: (!) 28.19      Hearing/Vision:  No exam data present  Hearing/Vision  Do you or your family notice any trouble with your hearing that hasn't been managed with hearing aids?: No  Do you have difficulty driving, watching TV, or doing any of your daily activities because of your eyesight?: No  Have you had an eye exam within the past year?: (!) No            Positive Risk Factor Interventions:          Personalized Preventive Plan   Current Health Maintenance Status    There is no immunization history on file for this patient.      Health Maintenance   Topic Date Due    Hepatitis C screen  Never done    COVID-19 Vaccine (1) Never done    DTaP/Tdap/Td vaccine (1 - Tdap) Never done    Shingles Vaccine (1 of 2) Never done    Pneumococcal 65+ years Vaccine (1 of 1 - PPSV23) Never done   Ram[facility]rez [facility] Santiago Annual Wellness Visit (AWV)  Never done    Flu vaccine (1) Never done    PSA counseling  10/16/2021    Colon cancer screen fecal DNA test (Cologuard)  10/31/2023    Lipid screen  10/16/2025    Hepatitis A vaccine  Aged Out    Hepatitis B vaccine  Aged Out    Hib vaccine  Aged Out    Meningococcal (ACWY) vaccine  Aged Out Recommendations for Preventive Services Due: see orders and patient instructions/AVS.    Recommended screening schedule for the next 5-10 years is provided to the patient in written form: see Patient Instructions/AVS.    Rupali Pizarro was seen today for medicare aw. Diagnoses and all orders for this visit:    Arthritis  -     CBC; Future  -     Comprehensive Metabolic Panel; Future  -     Urinalysis; Future    History of prostate cancer    Partial deafness of both ears    Vitreous debris  -     Ambulatory referral to Ophthalmology    Medicare annual wellness visit, subsequent  -     Lipid Panel; Future    Routine general medical examination at a health care facility           Healthy living guidelines were reviewed with patient  He is pretty active,  Compliant with therapy         Visit Information    Have you changed or started any medications since your last visit including any over-the-counter medicines, vitamins, or herbal medicines? no   Are you having any side effects from any of your medications? -  no  Have you stopped taking any of your medications? Is so, why? -  no    Have you seen any other physician or provider since your last visit? Yes - Records Requested  Have you had any other diagnostic tests since your last visit? No  Have you been seen in the emergency room and/or had an admission to a hospital since we last saw you? No  Have you had your routine dental cleaning in the past 6 months? no    Have you activated your [employer] account? If not, what are your barriers?  Yes     Patient Care Team:  Monica Martin MD as PCP - Brigette Paez MD as PCP - Roseanna Mak Provider    Medical History Review  Past Medical, Family, and Social History reviewed and does not contribute to the patient presenting condition    Health Maintenance   Topic Date Due    Hepatitis C screen  Never done    COVID-19 Vaccine (1) Never done    DTaP/Tdap/Td vaccine (1 - Tdap) Never done    Shingles Vaccine (1 of 2) Never done    Pneumococcal 65+ years Vaccine (1 of 1 - PPSV23) Never done   Shah Annual Wellness Visit (AWV)  Never done    Flu vaccine (1) Never done    PSA counseling  10/16/2021    Colon cancer screen fecal DNA test (Cologuard)  10/31/2023    Lipid screen  10/16/2025    Hepatitis A vaccine  Aged Out    Hepatitis B vaccine  Aged Out    Hib vaccine  Aged Out    Meningococcal (ACWY) vaccine  Aged Out                                                                                                                                                                                                         OFFICE VISIT  PROGRESS NOTE         Date of patient's visit: 11/23/21  Patient's Name:  Sera Gordillo  YOB: 1955       Patient Care Team:  Amina Sherman MD as PCP - Ellen Ambrose MD as PCP - [facility] Empaneled Provider        SUBJECTIVE     HISTORY           History of present illness     Pertinent details  added to ,       Chief Complaint   Patient presents with   Shah Medicare AWV          Encounter Diagnoses   Name Primary?  Arthritis Yes    History of prostate cancer     Partial deafness of both ears     Vitreous debris     Medicare annual wellness visit, subsequent     Routine general medical examination at a health care facility         Symptom / problem          --history obtained from patient. -   Patient is here for an annual wellness visit as well as follow-up on his comorbid conditions   He has arthritis of both knees and has seen Dr. Burton Duggan and Dr. 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 ALLERGIES:      Allergies   Allergen Reactions    Pravastatin Other (See Comments)     Myalgias         SOCIAL HISTORY   Reviewed and no change from previous record. Rupali Pizarro  reports that he has never smoked. He has never used smokeless tobacco.    FAMILY HISTORY:    Reviewed and No change from previous visit    REVIEW OF SYSTEMS:    Review of Systems        OBJECTIVE      Physical exam           Vitals:    11/23/21 1435   BP: 134/82   Site: Left Upper Arm   Position: Sitting   Weight: 185 lb 6.4 oz (84.1 kg)   Height: 5' 8\" (1.727 m)         Physical Exam   Vitals:  /82 (Site: Left Upper Arm, Position: Sitting)   Ht 5' 8\" (1.727 m)   Wt 185 lb 6.4 oz (84.1 kg)   BMI 28.19 kg/m²                 Body mass index is 28.19 kg/m².      Vitals:    11/23/21 1435   BP: 134/82   Site: Left Upper Arm   Position: Sitting   Weight: 185 lb 6.4 oz (84.1 kg)   Height: 5' 8\" (1.727 m)       General -alert, well appearing, and in no distress  Skin - normal coloration and turgor, no rashes,no suspicious skin lesions noted  Eyes - pupils equal and reactive, extraocular eye movementsintact  Ears - bilateral TM's and external ear canals normal  Nose - normal and patent, no erythema, discharge or polyps  Mouth - mucous membranes are moist, pharynx normal without lesions  Neck - supple, no significant adenopathy  Lymphatics - no palpable lymphadenopathy, no hepatosplenomegaly    Chest - clear to auscultation, no wheezes, rales or rhonchi, symmetric air entry    Heart - normal rate, regular rhythm, no murmurs, rubs, clicks or gallops    Extremities - peripheral pulses normal, no pedal edema       Abdomen - soft, nontender, nondistended, no masses or organomegaly    Back - full range of motion, notenderness, palpable spasm or pain on motion    Neurological - alert, oriented, normal speech, no focal sensory or motor deficit  Musculoskeletal - no joint tenderness, deformity or swelling                DIAGNOSTICS / INSERTS / IMAGES    [x] Reviewed       Labs      URINE ANALYSIS: No results found for: LABURIN     CBC:  Lab Results   Component Value Date    WBC 3.5 03/22/2018    WBC 0 03/22/2018    WBC 4.3 05/14/2015    HGB 15.2 03/22/2018     03/22/2018     05/14/2015     10/31/2011        BMP:    Lab Results   Component Value Date     10/16/2020    K 4.6 10/16/2020     10/16/2020    CO2 22 10/16/2020    BUN 15 10/16/2020    CREATININE 0.78 10/16/2020    GLUCOSE 84 10/16/2020    GLUCOSE 85 03/22/2018        No components found for: LDLC  No results found for: LABA1C  No results found for: POCGLU   .     LIVER PROFILE:  Lab Results   Component Value Date    ALT 20 10/16/2020    AST 16 10/16/2020    PROT 6.6 10/16/2020    BILITOT 0.46 10/16/2020    BILITOT NEGATIVE 03/22/2018    LABALBU 4.3 10/16/2020    LABALBU 4.6 10/31/2011          Results for orders placed or performed during the hospital encounter of 10/16/20   Psa screening   Result Value Ref Range    PSA 0.75 <4.1 ug/L   Lipid Panel   Result Value Ref Range    Cholesterol 203 (H) <200 mg/dL    HDL 47 >40 mg/dL    LDL Cholesterol 132 (H) 0 - 130 mg/dL    Chol/HDL Ratio 4.3 <5    Triglycerides 122 <150 mg/dL    VLDL NOT REPORTED 1 - 30 mg/dL   Comprehensive Metabolic Panel   Result Value Ref Range    Glucose 84 70 - 99 mg/dL    BUN 15 8 - 23 mg/dL    CREATININE 0.78 0.70 - 1.20 mg/dL    Bun/Cre Ratio NOT REPORTED 9 - 20    Calcium 9.1 8.6 - 10.4 mg/dL    Sodium 141 135 - 144 mmol/L    Potassium 4.6 3.7 - 5.3 mmol/L    Chloride 105 98 - 107 mmol/L    CO2 22 20 - 31 mmol/L    Anion Gap 14 9 - 17 mmol/L    Alkaline Phosphatase 45 40 - 129 U/L ALT 20 5 - 41 U/L    AST 16 <40 U/L    Total Bilirubin 0.46 0.3 - 1.2 mg/dL    Total Protein 6.6 6.4 - 8.3 g/dL    Albumin 4.3 3.5 - 5.2 g/dL    Albumin/Globulin Ratio 1.9 1.0 - 2.5    GFR Non-African American >60 >60 mL/min    GFR African American >60 >60 mL/min    GFR Comment          GFR Staging NOT REPORTED    Urinalysis   Result Value Ref Range    Color, UA YELLOW YELLOW    Turbidity UA CLEAR CLEAR    Glucose, Ur NEGATIVE NEGATIVE    Bilirubin Urine NEGATIVE NEGATIVE    Ketones, Urine NEGATIVE NEGATIVE    Specific Gravity, UA 1.012 1.005 - 1.030    Urine Hgb NEGATIVE NEGATIVE    pH, UA 8.0 5.0 - 8.0    Protein, UA NEGATIVE NEGATIVE    Urobilinogen, Urine Normal Normal    Nitrite, Urine NEGATIVE NEGATIVE    Leukocyte Esterase, Urine NEGATIVE NEGATIVE    Urinalysis Comments       Microscopic exam not performed based on chemical results unless requested in original order. VITALS INCLUDING BMI REVIEWED WITH PATIENT  Labs reviewed as noted above   Discussed with patient        ASSESSMENT / Pedro Pollen was seen today for medicare awv. Diagnoses and all orders for this visit:    Arthritis  Has polyarthritis but most significant in the knees  Has been following with the orthopedic doctors  Treatment options discussed  -     CBC; Future  -     Comprehensive Metabolic Panel; Future  -     Urinalysis; Future    History of prostate cancer  In remission  Partial deafness of both ears  Using hearing aids  Vitreous debris  Patient has been having floaters in front of both eyes in the last 6 months or so  Advised to see ophthalmologist  -     Ambulatory referral to Ophthalmology                       [address] office visit [address]    --Main issue are the knees  Floaters in the eyes  And arthritis in other joints--            Discussed use, benefit, and side effects of prescribed medications. [x] yes    All patient questions answered.   Patient voiced understanding. Patient given educational materials - see patient instructions  [] yes         There are no discontinued medications. Orders Placed This Encounter   Procedures    CBC     Standing Status:   Future     Standing Expiration Date:   11/23/2022    Comprehensive Metabolic Panel     Standing Status:   Future     Standing Expiration Date:   11/23/2022    Lipid Panel     Standing Status:   Future     Standing Expiration Date:   11/23/2022     Order Specific Question:   Is Patient Fasting?/# of Hours     Answer:   8    Urinalysis     Standing Status:   Future     Standing Expiration Date:   11/23/2022    Ambulatory referral to Ophthalmology     Referral Priority:   Routine     Referral Type:   Consult for Advice and Opinion     Referral Reason:   Specialty Services Required     Referred to Provider:   Almita Sanz MD     Requested Specialty:   Ophthalmology     Number of Visits Requested:   1        Patient Instructions     Personalized Preventive Plan for Mary Turner - 11/23/2021  Medicare offers a range of preventive health benefits. Some of the tests and screenings are paid in full while other may be subject to a deductible, co-insurance, and/or copay. Some of these benefits include a comprehensive review of your medical history including lifestyle, illnesses that may run in your family, and various assessments and screenings as appropriate. After reviewing your medical record and screening and assessments performed today your provider may have ordered immunizations, labs, imaging, and/or referrals for you. A list of these orders (if applicable) as well as your Preventive Care list are included within your After Visit Summary for your review. Other Preventive Recommendations:    · A preventive eye exam performed by an eye specialist is recommended every 1-2 years to screen for glaucoma; cataracts, macular degeneration, and other eye disorders.   · A preventive dental visit is recommended every 6 months. · Try to get at least 150 minutes of exercise per week or 10,000 steps per day on a pedometer . · Order or download the FREE \"Exercise & Physical Activity: Your Everyday Guide\" from [employer]. Call 5-564.103.7014 or search [employer] online. · You need 9476-5348 mg of calcium and 9255-8069 IU of vitamin D per day. It is possible to meet your calcium requirement with diet alone, but a vitamin D supplement is usually necessary to meet this goal.  · When exposed to the sun, use a sunscreen that protects against both UVA and UVB radiation with an SPF of 30 or greater. Reapply every 2 to 3 hours or after sweating, drying off with a towel, or swimming. · Always wear a seat belt when traveling in a car. Always wear a helmet when riding a bicycle or motorcycle. Medication List          Accurate as of 2021  6:00 PM. If you have any questions, ask your nurse or doctor. CONTINUE taking these medications    COVID-19 Antibody Test Kit  1 kit by In Vitro route once for 1 dose     meloxicam 15 MG tablet  Commonly known as: MOBIC  Take 1 tablet by mouth daily                FOLLOW UP  PLANS     Return in 1 year (on 2022) for Medicare Annual Wellness Visit in 1 year. Taryn Dunn MD  [address] Phone [phone]   Fax: [phone]  Answering Service: [phone]        Medicare Annual Wellness Visit  Name: Denisse Valdivia Date: 2021   MRN: [id number] Sex: Male   Age: 77 y.o. Ethnicity: Non- / Non    : 1955 Race: White (non-)      Lucy Katz is here for Medicare AWV    Screenings for behavioral, psychosocial and functional/safety risks, and cognitive dysfunction are all negative except as indicated below.  These results, as well as other patient data from the Health Risk Assessment form, are documented in cancer    Partial deafness of both ears    Vitreous debris  -     Ambulatory referral to Ophthalmology    Medicare annual wellness visit, subsequent  -     Lipid Panel;  Future    Routine general medical examination at a health care facility

## 2021-12-08 ENCOUNTER — OFFICE VISIT (OUTPATIENT)
Dept: ORTHOPEDIC SURGERY | Age: 66
End: 2021-12-08
Payer: MEDICARE

## 2021-12-08 DIAGNOSIS — M25.562 CHRONIC PAIN OF LEFT KNEE: Primary | ICD-10-CM

## 2021-12-08 DIAGNOSIS — G89.29 CHRONIC PAIN OF LEFT KNEE: Primary | ICD-10-CM

## 2021-12-08 PROCEDURE — 99213 OFFICE O/P EST LOW 20 MIN: CPT | Performed by: ORTHOPAEDIC SURGERY

## 2021-12-08 NOTE — PROGRESS NOTES
Adrianne Bajwa M.D.            [address] [place] [address]., [facility], [address] [place] [address]           Dept Phone: [phone]           Dept Fax:  [phone] [facility]           [place]          Dept Phone: [phone]           Dept Fax:  [phone]      Chief Compliant:  Chief Complaint   Patient presents with    Pain     LT KNEE        History of Present Illness: This is a 77 y.o. male who presents to the clinic today for evaluation / follow up of their left knee pain. Patient is a 14-year-old gentleman who had a previous knee arthroscopy done by me many years ago. He has had progressive increasing pain to his knee and to the point were beginning affect his daily activities. He has had an injection for Dr. Brittany Carlson in the past and he stated that the patient likely require knee arthroplasty. .       Review of Systems   Constitutional: Negative for fever, chills, sweats. Eyes: Negative for changes in vision, or pain. HENT: Negative for ear ache, epistaxis, or sore throat. Respiratory/Cardio: Negative for Chest pain, palpitations, SOB, or cough. Gastrointestinal: Negative for abdominal pain, N/V/D. Genitourinary: Negative for dysuria, frequency, urgency, or hematuria. Neurological: Negative for headache, numbness, or weakness. Integumentary: Negative for rash, itching, laceration, or abrasion. Musculoskeletal: Positive for Pain (LT KNEE)       Physical Exam:  Constitutional: Patient is oriented to person, place, and time. Patient appears well-developed and well nourished. HENT: Negative otherwise noted  Head: Normocephalic and Atraumatic  Nose: Normal  Eyes: Conjunctivae and EOM are normal  Neck: Normal range of motion Neck supple. Respiratory/Cardio: Effort normal. No respiratory distress.   Musculoskeletal: Examination patient left knee notes a varus knee. He has surprisingly good motion 0 to 130 degrees. He has crepitus and grinding medially. Ligamentously his collaterals are intact. He does not have an endpoint on Lachman's. Patellofemoral joint is moderately painful as well. No other contributory findings  Neurological: Patient is alert and oriented to person, place, and time. Normal strenght. No sensory deficit. Skin: Skin is warm and dry  Psychiatric: Behavior is normal. Thought content normal.  Nursing note and vitals reviewed. Labs and Imaging:     XR taken taken previously during Dr. Karla Hannon office reveal standing AP both knees and a lateral left knee. He has essentially bone-on-bone disease entire medial compartment left knee given overall varus disposition. He has spur formation as well medially as well as patellofemoral joint as viewed on lateral view. Patient has an older tunnel view which shows it while doing totally he is completely bone-on-bone throughout the entire medial compartment       Orders Placed This Encounter   Procedures    XR KNEE LEFT (1-2 VIEWS)     Standing Status:   Future     Number of Occurrences:   1     Standing Expiration Date:   12/8/2022       Assessment and Plan:  1. Chronic pain of left knee            This is a 77 y.o. male who presents to the clinic today for evaluation / follow up of severe DJD left knee.      Past History:    Current Outpatient Medications:     COVID-19 Antibody Test KIT, 1 kit by In Vitro route once for 1 dose, Disp: 1 kit, Rfl: 0    meloxicam (MOBIC) 15 MG tablet, Take 1 tablet by mouth daily, Disp: 90 tablet, Rfl: 1  Allergies   Allergen Reactions    Pravastatin Other (See Comments)     Myalgias       Social History     Socioeconomic History    Marital status:      Spouse name: Not on file    Number of children: Not on file    Years of education: Not on file    Highest education level: Not on file   Occupational History    Not on file   Tobacco Use    Smoking status: Never Smoker    Smokeless tobacco: Never Used   Substance and Sexual Activity    Alcohol use: Not on file    Drug use: Not on file    Sexual activity: Not on file   Other Topics Concern    Not on file   Social History Narrative    Not on file     Social Determinants of Health     Financial Resource Strain: Low Risk     Difficulty of Paying Living Expenses: Not hard at all   Food Insecurity: No Food Insecurity    Worried About Running Out of Food in the Last Year: Never true    [address] in the Last Year: Never true   Transportation Needs:     Lack of Transportation (Medical): Not on file    Lack of Transportation (Non-Medical): Not on file   Physical Activity:     Days of Exercise per Week: Not on file    Minutes of Exercise per Session: Not on file   Stress:     Feeling of Stress : Not on file   Social Connections:     Frequency of Communication with Friends and Family: Not on file    Frequency of Social Gatherings with Friends and Family: Not on file    Attends Holiness Services: Not on file    Active Member of [address] [place] [address] [employer] or Organizations: Not on file    Attends Club or Organization Meetings: Not on file    Marital Status: Not on file   Intimate Partner Violence:     Fear of Current or Ex-Partner: Not on file    Emotionally Abused: Not on file    Physically Abused: Not on file    Sexually Abused: Not on file   Housing Stability:     Unable to Pay for Housing in the Last Year: Not on file    Number of [place] in the Last Year: Not on file    Unstable Housing in the Last Year: Not on file     No past medical history on file. No past surgical history on file. No family history on file. Plan  Patient was advised that having failed injections as well as physical therapy that he would benefit from having a total knee arthroplasty. He he was made aware of the details procedure as well as the typical preoperative postoperative course as well as risk and benefits.     Patient does state that he would like to proceed with arthroplasty has his activities become significantly restricted he has sometimes knee giving way has had no relief with medications injections as well as exercise program.  As such we will get him scheduled accordingly pending clearance from Dr. Sunita Hayes    Provider Attestation:  Phuong Ross, personally performed the services described in this documentation. All medical record entries made by the scribe were at my direction and in my presence. I have reviewed the chart and discharge instructions and agree that the records reflect my personal performance and is accurate and complete. Cheli Gutiérrez MD. 12/08/21      Please note that this chart was generated using voice recognition Dragon dictation software. Although every effort was made to ensure the accuracy of this automated transcription, some errors in transcription may have occurred.

## 2021-12-28 ENCOUNTER — TELEPHONE (OUTPATIENT)
Dept: INTERNAL MEDICINE CLINIC | Age: 66
End: 2021-12-28

## 2021-12-28 ENCOUNTER — HOSPITAL ENCOUNTER (OUTPATIENT)
Dept: PREADMISSION TESTING | Age: 66
Discharge: HOME | End: 2022-01-01
Payer: MEDICARE

## 2021-12-28 VITALS
HEART RATE: 74 BPM | TEMPERATURE: 97.9 F | RESPIRATION RATE: 16 BRPM | DIASTOLIC BLOOD PRESSURE: 81 MMHG | SYSTOLIC BLOOD PRESSURE: 148 MMHG | WEIGHT: 170 LBS | BODY MASS INDEX: 25.76 KG/M2 | OXYGEN SATURATION: 97 % | HEIGHT: 68 IN

## 2021-12-28 LAB
ABSOLUTE EOS #: 0.2 K/UL (ref 0–0.4)
ABSOLUTE IMMATURE GRANULOCYTE: ABNORMAL K/UL (ref 0–0.3)
ABSOLUTE LYMPH #: 1.3 K/UL (ref 1–4.8)
ABSOLUTE MONO #: 0.5 K/UL (ref 0.1–1.3)
ANION GAP SERPL CALCULATED.3IONS-SCNC: 9 MMOL/L (ref 9–17)
BASOPHILS # BLD: 1 % (ref 0–2)
BASOPHILS ABSOLUTE: 0.1 K/UL (ref 0–0.2)
BILIRUBIN URINE: NEGATIVE
BUN BLDV-MCNC: 14 MG/DL (ref 8–23)
BUN/CREAT BLD: NORMAL (ref 9–20)
CALCIUM SERPL-MCNC: 9.6 MG/DL (ref 8.6–10.4)
CHLORIDE BLD-SCNC: 102 MMOL/L (ref 98–107)
CO2: 27 MMOL/L (ref 20–31)
COLOR: YELLOW
COMMENT UA: NORMAL
CREAT SERPL-MCNC: 0.76 MG/DL (ref 0.7–1.2)
DIFFERENTIAL TYPE: ABNORMAL
EOSINOPHILS RELATIVE PERCENT: 3 % (ref 0–4)
GFR AFRICAN AMERICAN: >60 ML/MIN
GFR NON-AFRICAN AMERICAN: >60 ML/MIN
GFR SERPL CREATININE-BSD FRML MDRD: NORMAL ML/MIN/{1.73_M2}
GFR SERPL CREATININE-BSD FRML MDRD: NORMAL ML/MIN/{1.73_M2}
GLUCOSE BLD-MCNC: 84 MG/DL (ref 70–99)
GLUCOSE URINE: NEGATIVE
HCT VFR BLD CALC: 45.4 % (ref 41–53)
HEMOGLOBIN: 15.3 G/DL (ref 13.5–17.5)
IMMATURE GRANULOCYTES: ABNORMAL %
KETONES, URINE: NEGATIVE
LEUKOCYTE ESTERASE, URINE: NEGATIVE
LYMPHOCYTES # BLD: 23 % (ref 24–44)
MCH RBC QN AUTO: 30 PG (ref 26–34)
MCHC RBC AUTO-ENTMCNC: 33.8 G/DL (ref 31–37)
MCV RBC AUTO: 88.7 FL (ref 80–100)
MONOCYTES # BLD: 9 % (ref 1–7)
NITRITE, URINE: NEGATIVE
NRBC AUTOMATED: ABNORMAL PER 100 WBC
PDW BLD-RTO: 13.4 % (ref 11.5–14.9)
PH UA: 7 (ref 5–8)
PLATELET # BLD: 184 K/UL (ref 150–450)
PLATELET ESTIMATE: ABNORMAL
PMV BLD AUTO: 10.2 FL (ref 6–12)
POTASSIUM SERPL-SCNC: 4.4 MMOL/L (ref 3.7–5.3)
PROTEIN UA: NEGATIVE
RBC # BLD: 5.12 M/UL (ref 4.5–5.9)
RBC # BLD: ABNORMAL 10*6/UL
SEG NEUTROPHILS: 64 % (ref 36–66)
SEGMENTED NEUTROPHILS ABSOLUTE COUNT: 3.6 K/UL (ref 1.3–9.1)
SODIUM BLD-SCNC: 138 MMOL/L (ref 135–144)
SPECIFIC GRAVITY UA: 1.01 (ref 1–1.03)
TURBIDITY: CLEAR
URINE HGB: NEGATIVE
UROBILINOGEN, URINE: NORMAL
WBC # BLD: 5.7 K/UL (ref 3.5–11)
WBC # BLD: ABNORMAL 10*3/UL

## 2021-12-28 PROCEDURE — 81003 URINALYSIS AUTO W/O SCOPE: CPT

## 2021-12-28 PROCEDURE — 80048 BASIC METABOLIC PNL TOTAL CA: CPT

## 2021-12-28 PROCEDURE — 85025 COMPLETE CBC W/AUTO DIFF WBC: CPT

## 2021-12-28 PROCEDURE — 93005 ELECTROCARDIOGRAM TRACING: CPT | Performed by: ANESTHESIOLOGY

## 2021-12-28 PROCEDURE — 87641 MR-STAPH DNA AMP PROBE: CPT

## 2021-12-28 PROCEDURE — 36415 COLL VENOUS BLD VENIPUNCTURE: CPT

## 2021-12-28 ASSESSMENT — ENCOUNTER SYMPTOMS
NAUSEA: 0
DIARRHEA: 0
COUGH: 0
BLOOD IN STOOL: 0
WHEEZING: 0
TROUBLE SWALLOWING: 0
RHINORRHEA: 0
SORE THROAT: 0
SHORTNESS OF BREATH: 0
ABDOMINAL PAIN: 0
CONSTIPATION: 0
VOMITING: 0

## 2021-12-28 NOTE — PROGRESS NOTES
Dr. Etta Livingston, anesthesia, was contacted and informed of patient's history, unconfirmed abnormal EKG and planned surgery. Medical clearance requested. Surgery scheduling will notify Dr. Amy Mai office who will be responsible for making sure the clearance is obtained and is in the chart for surgery.

## 2021-12-28 NOTE — H&P (VIEW-ONLY)
HISTORY and [facility] [address]       NAME:  Estevan Hatchet  MRN: [id number]   YOB: 1955   Date: 12/28/2021   Age: 77 y.o. Gender: male     COMPLAINT AND PRESENT HISTORY:   Estevan Hatchet is 77 y.o.,  male, presents for pre-anesthesia/admission testing for KNEE TOTAL ARTHROPLASTY- LEFT per Dr. Simon Rucker. Primary dx: DJD LEFT KNEE.    HPI:  Knee Pain   Incident onset: Patients states d/t injury about 30 years ago, had torn ACL d/t playing basketball, pain progressively worsened over time- hx of arthroscopy- states \"cleaned up\", then had another arthroscopy done w/Dr. Farhan Gamboa. The injury mechanism was a compression (Another person came down on his knee playing basketball, hyperextended knee). The pain is present in the left knee. The quality of the pain is described as aching (With walking has pain- aching). Pain scale: Average: (depends on activity)- right now 0 pain, w/walking a mile, goes up to a 8. The pain has been fluctuating since onset. Associated symptoms include an inability to bear weight (Difficult to climb stairs, rocks (not stable)). Pertinent negatives include no loss of motion, loss of sensation, numbness or tingling. The symptoms are aggravated by weight bearing and movement. He has tried NSAIDs and immobilization (Meloxicam, PT, injection x1 (worked for about a month), brace) for the symptoms. Testing completed r/t condition:  X-RAY LEFT KNEE DONE 12-8-21:  Narrative   X-rays taken they reviewed by me show standing AP both knees and lateral    left knee.  Patient has significant varus gonarthrosis left knee with    bone-on-bone apposition medial compartment left knee.  Also patellofemoral    disease in the lateral view.  Right knee is not nearly as involved     Review of additional significant medical hx:  HLD, abnormal EKG: Was taking pravastatin, dose was increased, causing joint pain- stopped taking (states PCP aware).  Denies current/recent chest pain, palpitations, SOB, dizziness, leg swelling, headache. Abnormal EKG w/LBBB noted on today's EKG, advised f/u with PCP. PROSTATE CA: Tx in 2010 w/external beam proton radiation, surgery was not done except for prostate biopsies. Denies hx of MRSA infection. Denies hx of blood clots. Denies hx of any personal or family hx of complications w/anesthesia. + anxiety. PAST MEDICAL HISTORY     Past Medical History:   Diagnosis Date    Abnormal EKG     12-28-21    Arthritis     Eye problem     Right- states was seeing floaters, was told problem with the \"jelly\" of eye    Hearing loss     B/l- hearing aids    Hyperlipidemia     LBBB (left bundle branch block)     Per EKG 12-28-21    Left knee DJD     Prostate CA ([address]     Tx in 2010 w/external beam proton radiation, surgery was not done except for prostate biopsies.        SURGICAL HISTORY       Past Surgical History:   Procedure Laterality Date    ABSCESS DRAINAGE      Anal    COLONOSCOPY      INGUINAL HERNIA REPAIR Bilateral     KNEE ARTHROSCOPY Left     x2    PROSTATE BIOPSY      Multiple       SOCIAL HISTORY       Social History     Socioeconomic History    Marital status:      Spouse name: None    Number of children: None    Years of education: None    Highest education level: None   Occupational History    None   Tobacco Use    Smoking status: Never Smoker    Smokeless tobacco: Never Used   Vaping Use    Vaping Use: Never used   Substance and Sexual Activity    Alcohol use: Not Currently     Alcohol/week: 5.0 - 6.0 standard drinks     Types: 5 - 6 Standard drinks or equivalent per week     Comment: 5-6 glasses of wine, beer, or burbon per week    Drug use: Never    Sexual activity: None   Other Topics Concern    None   Social History Narrative    None     Social Determinants of Health     Financial Resource Strain: Low Risk     Difficulty of Paying Living Expenses: Not hard at all   Food Insecurity: No Food Insecurity    Worried About Running Out of Food in the Last Year: Never true    Ran Out of Food in the Last Year: Never true   Transportation Needs:     Lack of Transportation (Medical): Not on file    Lack of Transportation (Non-Medical): Not on file   Physical Activity:     Days of Exercise per Week: Not on file    Minutes of Exercise per Session: Not on file   Stress:     Feeling of Stress : Not on file   Social Connections:     Frequency of Communication with Friends and Family: Not on file    Frequency of Social Gatherings with Friends and Family: Not on file    Attends [employer] Services: Not on file    Active Member of [address] [place] [address] [employer] or Organizations: Not on file    Attends Club or Organization Meetings: Not on file    Marital Status: Not on file   Intimate Partner Violence:     Fear of Current or Ex-Partner: Not on file    Emotionally Abused: Not on file    Physically Abused: Not on file    Sexually Abused: Not on file   Housing Stability:     Unable to Pay for Housing in the Last Year: Not on file    Number of [place] in the Last Year: Not on file    Unstable Housing in the Last Year: Not on file       REVIEW OF SYSTEMS      Allergies   Allergen Reactions    Pravastatin Other (See Comments)     Myalgias         Current Outpatient Medications on File Prior to Encounter   Medication Sig Dispense Refill    meloxicam (MOBIC) 15 MG tablet Take 1 tablet by mouth daily 90 tablet 1    COVID-19 Antibody Test KIT 1 kit by In Vitro route once for 1 dose 1 kit 0     No current facility-administered medications on file prior to encounter. Review of Systems   Constitutional: Negative for chills and fever. HENT: Positive for dental problem (Filling recently came off of top molar- denies loose teeth) and hearing loss. Negative for congestion, ear pain, rhinorrhea, sore throat and trouble swallowing. Eyes: Positive for visual disturbance (Right eye- issue for about a year).    Respiratory: Negative for cough, shortness of breath and wheezing. Cardiovascular: Negative for chest pain, palpitations and leg swelling. Gastrointestinal: Negative for abdominal pain, blood in stool, constipation, diarrhea, nausea and vomiting. Genitourinary: Negative for dysuria and frequency. Musculoskeletal: Positive for arthralgias (Right hip, left knee), gait problem and joint swelling (Left knee). SEE HPI. Skin: Negative for rash. Neurological: Positive for dizziness (C/o dizziness while taking deep breaths during exam and wearing mask, resolved after this was done- states he does not typically get dizzy). Negative for tingling, numbness and headaches. Hematological: Does not bruise/bleed easily. GENERAL PHYSICAL EXAM     Vitals: BP (!) 148/81   Pulse 74   Temp 97.9 °F (36.6 °C) (Temporal)   Resp 16   Ht 5' 8\" (1.727 m)   Wt 170 lb (77.1 kg)   SpO2 97%   BMI 25.85 kg/m²               Physical Exam  Vitals reviewed. Constitutional:       General: He is not in acute distress. Appearance: He is well-developed. He is not ill-appearing, toxic-appearing or diaphoretic. HENT:      Head: Normocephalic. Right Ear: External ear normal.      Left Ear: External ear normal.      Nose: Nose normal.      Mouth/Throat:      Pharynx: No oropharyngeal exudate or posterior oropharyngeal erythema. Tonsils: No tonsillar abscesses. Eyes:      General:         Right eye: No discharge. Left eye: No discharge. Conjunctiva/sclera:      Right eye: Right conjunctiva is injected (Minor). Left eye: Left conjunctiva is injected (Minor). Pupils: Pupils are equal.      Right eye: Pupil is not round (Very minimal shape irregularity- states eye was dilated last week- saw eye doctor/PCP also aware of right eye problem). Pupil is reactive and not sluggish. Left eye: Pupil is round, reactive and not sluggish. Cardiovascular:      Rate and Rhythm: Normal rate and regular rhythm.       Pulses: Intact distal pulses. Heart sounds: Normal heart sounds. Pulmonary:      Effort: Pulmonary effort is normal. No accessory muscle usage or respiratory distress. Breath sounds: Normal breath sounds. No decreased breath sounds, wheezing, rhonchi or rales. Abdominal:      General: Bowel sounds are normal. There is no distension. Palpations: Abdomen is soft. There is no mass. Tenderness: There is no abdominal tenderness. There is no guarding or rebound. Musculoskeletal:      Right knee: Swelling (Minor, no erythema, no warmth) and bony tenderness present. No deformity. Normal range of motion. Tenderness present over the medial joint line. Normal pulse. Right lower leg: No swelling or tenderness. No edema. Left lower leg: No swelling or tenderness. No edema. Comments: Negative Lazaro's sign b/l. Lymphadenopathy:      Cervical: No cervical adenopathy. Skin:     General: Skin is warm and dry. Findings: Lesion (Pea sized purplish papular lesion to bottom lip, patient's wife states not new) present. Neurological:      Mental Status: He is alert and oriented to person, place, and time.    Psychiatric:         Behavior: Behavior normal.         LAB REVIEW     Lab Results   Component Value Date     12/28/2021    K 4.4 12/28/2021     12/28/2021    CO2 27 12/28/2021    BUN 14 12/28/2021    CREATININE 0.76 12/28/2021    GLUCOSE 84 12/28/2021    CALCIUM 9.6 12/28/2021    PROT 6.6 10/16/2020    LABALBU 4.3 10/16/2020    BILITOT 0.46 10/16/2020    ALKPHOS 45 10/16/2020    AST 16 10/16/2020    ALT 20 10/16/2020    LABGLOM >60 12/28/2021    GFRAA >60 12/28/2021     Lab Results   Component Value Date    WBC 5.7 12/28/2021    HGB 15.3 12/28/2021    HCT 45.4 12/28/2021    MCV 88.7 12/28/2021     12/28/2021       SURGERY / PROVISIONAL DIAGNOSES:      KNEE TOTAL ARTHROPLASTY- LEFT     DJD LEFT KNEE    Patient Active Problem List    Diagnosis Date Noted    Skin lesion rt hand wart 10/15/2020    Partial deafness of both ears 03/20/2018    History of prostate cancer 09/21/2017    HLD (hyperlipidemia) 03/25/2015    Arthritis 03/25/2015           Clearance requested per anesthesiologist: ADONIS Ramsey CNP on 12/28/2021 at 4:16 PM

## 2021-12-28 NOTE — TELEPHONE ENCOUNTER
Patient had PAT done today for upcoming surgery. (Results in Epic). He was told to contact his PCP due to abnormal EKG. Please advise.

## 2021-12-28 NOTE — H&P
HISTORY and [address]       NAME:  Daniella Rudolph  MRN: [id number]   YOB: 1955   Date: 12/28/2021   Age: 77 y.o. Gender: male     COMPLAINT AND PRESENT HISTORY:   Daniella Rudolph is 77 y.o.,  male, presents for pre-anesthesia/admission testing for KNEE TOTAL ARTHROPLASTY- LEFT per Dr. Josefina Silva. Primary dx: DJD LEFT KNEE.    HPI:  Knee Pain   Incident onset: Patients states d/t injury about 30 years ago, had torn ACL d/t playing basketball, pain progressively worsened over time- hx of arthroscopy- states \"cleaned up\", then had another arthroscopy done w/Dr. Yolande Vo. The injury mechanism was a compression (Another person came down on his knee playing basketball, hyperextended knee). The pain is present in the left knee. The quality of the pain is described as aching (With walking has pain- aching). Pain scale: Average: (depends on activity)- right now 0 pain, w/walking a mile, goes up to a 8. The pain has been fluctuating since onset. Associated symptoms include an inability to bear weight (Difficult to climb stairs, rocks (not stable)). Pertinent negatives include no loss of motion, loss of sensation, numbness or tingling. The symptoms are aggravated by weight bearing and movement. He has tried NSAIDs and immobilization (Meloxicam, PT, injection x1 (worked for about a month), brace) for the symptoms. Testing completed r/t condition:  X-RAY LEFT KNEE DONE 12-8-21:  Narrative   X-rays taken they reviewed by me show standing AP both knees and lateral    left knee.  Patient has significant varus gonarthrosis left knee with    bone-on-bone apposition medial compartment left knee.  Also patellofemoral    disease in the lateral view.  Right knee is not nearly as involved     Review of additional significant medical hx:  HLD, abnormal EKG: Was taking pravastatin, dose was increased, causing joint pain- stopped taking (states PCP aware).  Denies current/recent chest pain, palpitations, SOB, dizziness, leg swelling, headache. Abnormal EKG w/LBBB noted on today's EKG, advised f/u with PCP. PROSTATE CA: Tx in 2010 w/external beam proton radiation, surgery was not done except for prostate biopsies. Denies hx of MRSA infection. Denies hx of blood clots. Denies hx of any personal or family hx of complications w/anesthesia. + anxiety. PAST MEDICAL HISTORY     Past Medical History:   Diagnosis Date    Abnormal EKG     12-28-21    Arthritis     Eye problem     Right- states was seeing floaters, was told problem with the \"jelly\" of eye    Hearing loss     B/l- hearing aids    Hyperlipidemia     LBBB (left bundle branch block)     Per EKG 12-28-21    Left knee DJD     Prostate CA ([address]     Tx in 2010 w/external beam proton radiation, surgery was not done except for prostate biopsies.        SURGICAL HISTORY       Past Surgical History:   Procedure Laterality Date    ABSCESS DRAINAGE      Anal    COLONOSCOPY      INGUINAL HERNIA REPAIR Bilateral     KNEE ARTHROSCOPY Left     x2    PROSTATE BIOPSY      Multiple       SOCIAL HISTORY       Social History     Socioeconomic History    Marital status:      Spouse name: None    Number of children: None    Years of education: None    Highest education level: None   Occupational History    None   Tobacco Use    Smoking status: Never Smoker    Smokeless tobacco: Never Used   Vaping Use    Vaping Use: Never used   Substance and Sexual Activity    Alcohol use: Not Currently     Alcohol/week: 5.0 - 6.0 standard drinks     Types: 5 - 6 Standard drinks or equivalent per week     Comment: 5-6 glasses of wine, beer, or burbon per week    Drug use: Never    Sexual activity: None   Other Topics Concern    None   Social History Narrative    None     Social Determinants of Health     Financial Resource Strain: Low Risk     Difficulty of Paying Living Expenses: Not hard at all   Food Insecurity: No Food Insecurity    Worried About Running Out of Food in the Last Year: Never true    Ran Out of Food in the Last Year: Never true   Transportation Needs:     Lack of Transportation (Medical): Not on file    Lack of Transportation (Non-Medical): Not on file   Physical Activity:     Days of Exercise per Week: Not on file    Minutes of Exercise per Session: Not on file   Stress:     Feeling of Stress : Not on file   Social Connections:     Frequency of Communication with Friends and Family: Not on file    Frequency of Social Gatherings with Friends and Family: Not on file    Attends [employer] Services: Not on file    Active Member of [address] [place] [address] [employer] or Organizations: Not on file    Attends Club or Organization Meetings: Not on file    Marital Status: Not on file   Intimate Partner Violence:     Fear of Current or Ex-Partner: Not on file    Emotionally Abused: Not on file    Physically Abused: Not on file    Sexually Abused: Not on file   Housing Stability:     Unable to Pay for Housing in the Last Year: Not on file    Number of [place] in the Last Year: Not on file    Unstable Housing in the Last Year: Not on file       REVIEW OF SYSTEMS      Allergies   Allergen Reactions    Pravastatin Other (See Comments)     Myalgias         Current Outpatient Medications on File Prior to Encounter   Medication Sig Dispense Refill    meloxicam (MOBIC) 15 MG tablet Take 1 tablet by mouth daily 90 tablet 1    COVID-19 Antibody Test KIT 1 kit by In Vitro route once for 1 dose 1 kit 0     No current facility-administered medications on file prior to encounter. Review of Systems   Constitutional: Negative for chills and fever. HENT: Positive for dental problem (Filling recently came off of top molar- denies loose teeth) and hearing loss. Negative for congestion, ear pain, rhinorrhea, sore throat and trouble swallowing. Eyes: Positive for visual disturbance (Right eye- issue for about a year).    Respiratory: Negative for cough, shortness of breath and wheezing. Cardiovascular: Negative for chest pain, palpitations and leg swelling. Gastrointestinal: Negative for abdominal pain, blood in stool, constipation, diarrhea, nausea and vomiting. Genitourinary: Negative for dysuria and frequency. Musculoskeletal: Positive for arthralgias (Right hip, left knee), gait problem and joint swelling (Left knee). SEE HPI. Skin: Negative for rash. Neurological: Positive for dizziness (C/o dizziness while taking deep breaths during exam and wearing mask, resolved after this was done- states he does not typically get dizzy). Negative for tingling, numbness and headaches. Hematological: Does not bruise/bleed easily. GENERAL PHYSICAL EXAM     Vitals: BP (!) 148/81   Pulse 74   Temp 97.9 °F (36.6 °C) (Temporal)   Resp 16   Ht 5' 8\" (1.727 m)   Wt 170 lb (77.1 kg)   SpO2 97%   BMI 25.85 kg/m²               Physical Exam  Vitals reviewed. Constitutional:       General: He is not in acute distress. Appearance: He is well-developed. He is not ill-appearing, toxic-appearing or diaphoretic. HENT:      Head: Normocephalic. Right Ear: External ear normal.      Left Ear: External ear normal.      Nose: Nose normal.      Mouth/Throat:      Pharynx: No oropharyngeal exudate or posterior oropharyngeal erythema. Tonsils: No tonsillar abscesses. Eyes:      General:         Right eye: No discharge. Left eye: No discharge. Conjunctiva/sclera:      Right eye: Right conjunctiva is injected (Minor). Left eye: Left conjunctiva is injected (Minor). Pupils: Pupils are equal.      Right eye: Pupil is not round (Very minimal shape irregularity- states eye was dilated last week- saw eye doctor/PCP also aware of right eye problem). Pupil is reactive and not sluggish. Left eye: Pupil is round, reactive and not sluggish. Cardiovascular:      Rate and Rhythm: Normal rate and regular rhythm.       Pulses: Intact distal pulses. Heart sounds: Normal heart sounds. Pulmonary:      Effort: Pulmonary effort is normal. No accessory muscle usage or respiratory distress. Breath sounds: Normal breath sounds. No decreased breath sounds, wheezing, rhonchi or rales. Abdominal:      General: Bowel sounds are normal. There is no distension. Palpations: Abdomen is soft. There is no mass. Tenderness: There is no abdominal tenderness. There is no guarding or rebound. Musculoskeletal:      Right knee: Swelling (Minor, no erythema, no warmth) and bony tenderness present. No deformity. Normal range of motion. Tenderness present over the medial joint line. Normal pulse. Right lower leg: No swelling or tenderness. No edema. Left lower leg: No swelling or tenderness. No edema. Comments: Negative Lazaro's sign b/l. Lymphadenopathy:      Cervical: No cervical adenopathy. Skin:     General: Skin is warm and dry. Findings: Lesion (Pea sized purplish papular lesion to bottom lip, patient's wife states not new) present. Neurological:      Mental Status: He is alert and oriented to person, place, and time.    Psychiatric:         Behavior: Behavior normal.         LAB REVIEW     Lab Results   Component Value Date     12/28/2021    K 4.4 12/28/2021     12/28/2021    CO2 27 12/28/2021    BUN 14 12/28/2021    CREATININE 0.76 12/28/2021    GLUCOSE 84 12/28/2021    CALCIUM 9.6 12/28/2021    PROT 6.6 10/16/2020    LABALBU 4.3 10/16/2020    BILITOT 0.46 10/16/2020    ALKPHOS 45 10/16/2020    AST 16 10/16/2020    ALT 20 10/16/2020    LABGLOM >60 12/28/2021    GFRAA >60 12/28/2021     Lab Results   Component Value Date    WBC 5.7 12/28/2021    HGB 15.3 12/28/2021    HCT 45.4 12/28/2021    MCV 88.7 12/28/2021     12/28/2021       SURGERY / PROVISIONAL DIAGNOSES:      KNEE TOTAL ARTHROPLASTY- LEFT     DJD LEFT KNEE    Patient Active Problem List    Diagnosis Date Noted    Skin lesion rt hand wart 10/15/2020    Partial deafness of both ears 03/20/2018    History of prostate cancer 09/21/2017    HLD (hyperlipidemia) 03/25/2015    Arthritis 03/25/2015           Clearance requested per anesthesiologist: ADONIS Pride CNP on 12/28/2021 at 4:16 PM

## 2021-12-29 LAB
MRSA, DNA, NASAL: NORMAL
SPECIMEN DESCRIPTION: NORMAL

## 2021-12-31 LAB
EKG ATRIAL RATE: 66 BPM
EKG P AXIS: 55 DEGREES
EKG P-R INTERVAL: 152 MS
EKG Q-T INTERVAL: 428 MS
EKG QRS DURATION: 132 MS
EKG QTC CALCULATION (BAZETT): 448 MS
EKG R AXIS: -45 DEGREES
EKG T AXIS: 81 DEGREES
EKG VENTRICULAR RATE: 66 BPM

## 2021-12-31 PROCEDURE — 93010 ELECTROCARDIOGRAM REPORT: CPT | Performed by: INTERNAL MEDICINE

## 2022-01-03 ENCOUNTER — TELEPHONE (OUTPATIENT)
Dept: INTERNAL MEDICINE CLINIC | Age: 67
End: 2022-01-03

## 2022-01-03 NOTE — TELEPHONE ENCOUNTER
Medical surgical clearance request received 01/03/22    Surgeon: Dr Toro De    Procedure: LEFT TOTAL KNEE    Date of Procedure: 01/11/2022    Last appt: 11/23/2021    Next appt: 5/24/2022    PATs received:    [] yes   [x] no

## 2022-01-03 NOTE — TELEPHONE ENCOUNTER
Patient is getting a little nervous. He does NOT want to have to cancel surgery due to the EKG and the time it is taking to get this reviewed. Please address EKG.

## 2022-01-03 NOTE — TELEPHONE ENCOUNTER
Patient's surgery is 01/11/22. He needs to know if there has to be something done about the EKG before surgery.

## 2022-01-10 ENCOUNTER — ANESTHESIA EVENT (OUTPATIENT)
Dept: OPERATING ROOM | Age: 67
End: 2022-01-10
Payer: MEDICARE

## 2022-01-11 ENCOUNTER — APPOINTMENT (OUTPATIENT)
Dept: GENERAL RADIOLOGY | Age: 67
End: 2022-01-11
Attending: ORTHOPAEDIC SURGERY
Payer: MEDICARE

## 2022-01-11 ENCOUNTER — ANESTHESIA (OUTPATIENT)
Dept: OPERATING ROOM | Age: 67
End: 2022-01-11
Payer: MEDICARE

## 2022-01-11 ENCOUNTER — HOSPITAL ENCOUNTER (OUTPATIENT)
Age: 67
Discharge: HOME | End: 2022-01-11
Attending: ORTHOPAEDIC SURGERY | Admitting: ORTHOPAEDIC SURGERY
Payer: MEDICARE

## 2022-01-11 VITALS — DIASTOLIC BLOOD PRESSURE: 69 MMHG | TEMPERATURE: 75 F | SYSTOLIC BLOOD PRESSURE: 146 MMHG | OXYGEN SATURATION: 97 %

## 2022-01-11 VITALS
SYSTOLIC BLOOD PRESSURE: 137 MMHG | HEART RATE: 86 BPM | WEIGHT: 170 LBS | BODY MASS INDEX: 25.76 KG/M2 | DIASTOLIC BLOOD PRESSURE: 74 MMHG | HEIGHT: 68 IN | TEMPERATURE: 97.4 F | RESPIRATION RATE: 13 BRPM | OXYGEN SATURATION: 94 %

## 2022-01-11 DIAGNOSIS — M17.12 PRIMARY OSTEOARTHRITIS OF LEFT KNEE: Primary | ICD-10-CM

## 2022-01-11 PROCEDURE — 2580000003 HC RX 258: Performed by: NURSE ANESTHETIST, CERTIFIED REGISTERED

## 2022-01-11 PROCEDURE — 7100000001 HC PACU RECOVERY - ADDTL 15 MIN: Performed by: ORTHOPAEDIC SURGERY

## 2022-01-11 PROCEDURE — 6360000002 HC RX W HCPCS: Performed by: ORTHOPAEDIC SURGERY

## 2022-01-11 PROCEDURE — 7100000000 HC PACU RECOVERY - FIRST 15 MIN: Performed by: ORTHOPAEDIC SURGERY

## 2022-01-11 PROCEDURE — C1776 JOINT DEVICE (IMPLANTABLE): HCPCS | Performed by: ORTHOPAEDIC SURGERY

## 2022-01-11 PROCEDURE — 97166 OT EVAL MOD COMPLEX 45 MIN: CPT

## 2022-01-11 PROCEDURE — C9290 INJ, BUPIVACAINE LIPOSOME: HCPCS | Performed by: ANESTHESIOLOGY

## 2022-01-11 PROCEDURE — 64447 NJX AA&/STRD FEMORAL NRV IMG: CPT | Performed by: ANESTHESIOLOGY

## 2022-01-11 PROCEDURE — 2709999900 HC NON-CHARGEABLE SUPPLY: Performed by: ORTHOPAEDIC SURGERY

## 2022-01-11 PROCEDURE — 3700000001 HC ADD 15 MINUTES (ANESTHESIA): Performed by: ORTHOPAEDIC SURGERY

## 2022-01-11 PROCEDURE — 97535 SELF CARE MNGMENT TRAINING: CPT

## 2022-01-11 PROCEDURE — 6370000000 HC RX 637 (ALT 250 FOR IP): Performed by: ORTHOPAEDIC SURGERY

## 2022-01-11 PROCEDURE — 2500000003 HC RX 250 WO HCPCS: Performed by: NURSE ANESTHETIST, CERTIFIED REGISTERED

## 2022-01-11 PROCEDURE — 3600000013 HC SURGERY LEVEL 3 ADDTL 15MIN: Performed by: ORTHOPAEDIC SURGERY

## 2022-01-11 PROCEDURE — 6360000002 HC RX W HCPCS: Performed by: NURSE ANESTHETIST, CERTIFIED REGISTERED

## 2022-01-11 PROCEDURE — 27447 TOTAL KNEE ARTHROPLASTY: CPT | Performed by: ORTHOPAEDIC SURGERY

## 2022-01-11 PROCEDURE — 73560 X-RAY EXAM OF KNEE 1 OR 2: CPT

## 2022-01-11 PROCEDURE — 3600000003 HC SURGERY LEVEL 3 BASE: Performed by: ORTHOPAEDIC SURGERY

## 2022-01-11 PROCEDURE — 2500000003 HC RX 250 WO HCPCS: Performed by: ANESTHESIOLOGY

## 2022-01-11 PROCEDURE — 2580000003 HC RX 258: Performed by: ORTHOPAEDIC SURGERY

## 2022-01-11 PROCEDURE — 97530 THERAPEUTIC ACTIVITIES: CPT

## 2022-01-11 PROCEDURE — 97161 PT EVAL LOW COMPLEX 20 MIN: CPT

## 2022-01-11 PROCEDURE — 6360000002 HC RX W HCPCS: Performed by: ANESTHESIOLOGY

## 2022-01-11 PROCEDURE — C1713 ANCHOR/SCREW BN/BN,TIS/BN: HCPCS | Performed by: ORTHOPAEDIC SURGERY

## 2022-01-11 PROCEDURE — 97116 GAIT TRAINING THERAPY: CPT

## 2022-01-11 PROCEDURE — 2500000003 HC RX 250 WO HCPCS: Performed by: ORTHOPAEDIC SURGERY

## 2022-01-11 PROCEDURE — 3700000000 HC ANESTHESIA ATTENDED CARE: Performed by: ORTHOPAEDIC SURGERY

## 2022-01-11 PROCEDURE — 2720000010 HC SURG SUPPLY STERILE: Performed by: ORTHOPAEDIC SURGERY

## 2022-01-11 PROCEDURE — 2580000003 HC RX 258: Performed by: ANESTHESIOLOGY

## 2022-01-11 DEVICE — COMPONENT PAT DIA34MM THK7.8MM THN KNEE POLY 3 PEG SER A: Type: IMPLANTABLE DEVICE | Site: KNEE | Status: FUNCTIONAL

## 2022-01-11 DEVICE — TRAY TIB L75MM KNEE CO CHROM FIN MOD INTLOK CEM VANGUARD: Type: IMPLANTABLE DEVICE | Site: KNEE | Status: FUNCTIONAL

## 2022-01-11 DEVICE — COMPONENT FEM 67.5MM L KNEE CO CHROM NP INTLOK PRI CRUCE: Type: IMPLANTABLE DEVICE | Site: KNEE | Status: FUNCTIONAL

## 2022-01-11 DEVICE — CEMENT BNE 40GM HI VISC RADPQ FOR REV SURG: Type: IMPLANTABLE DEVICE | Site: KNEE | Status: FUNCTIONAL

## 2022-01-11 DEVICE — VNGD ANT STAB BRG 13X75: Type: IMPLANTABLE DEVICE | Site: KNEE | Status: FUNCTIONAL

## 2022-01-11 RX ORDER — BUPIVACAINE HYDROCHLORIDE 7.5 MG/ML
INJECTION, SOLUTION INTRASPINAL PRN
Status: DISCONTINUED | OUTPATIENT
Start: 2022-01-11 | End: 2022-01-11 | Stop reason: SDUPTHER

## 2022-01-11 RX ORDER — DEXAMETHASONE SODIUM PHOSPHATE 10 MG/ML
10 INJECTION, SOLUTION INTRAMUSCULAR; INTRAVENOUS ONCE
Status: COMPLETED | OUTPATIENT
Start: 2022-01-11 | End: 2022-01-11

## 2022-01-11 RX ORDER — LABETALOL HYDROCHLORIDE 5 MG/ML
5 INJECTION, SOLUTION INTRAVENOUS EVERY 10 MIN PRN
Status: DISCONTINUED | OUTPATIENT
Start: 2022-01-11 | End: 2022-01-11 | Stop reason: HOSPADM

## 2022-01-11 RX ORDER — FENTANYL CITRATE 50 UG/ML
INJECTION, SOLUTION INTRAMUSCULAR; INTRAVENOUS PRN
Status: DISCONTINUED | OUTPATIENT
Start: 2022-01-11 | End: 2022-01-11 | Stop reason: SDUPTHER

## 2022-01-11 RX ORDER — SODIUM CHLORIDE, SODIUM LACTATE, POTASSIUM CHLORIDE, CALCIUM CHLORIDE 600; 310; 30; 20 MG/100ML; MG/100ML; MG/100ML; MG/100ML
INJECTION, SOLUTION INTRAVENOUS CONTINUOUS PRN
Status: DISCONTINUED | OUTPATIENT
Start: 2022-01-11 | End: 2022-01-11 | Stop reason: SDUPTHER

## 2022-01-11 RX ORDER — BUPIVACAINE HYDROCHLORIDE 5 MG/ML
INJECTION, SOLUTION EPIDURAL; INTRACAUDAL
Status: DISCONTINUED | OUTPATIENT
Start: 2022-01-11 | End: 2022-01-11 | Stop reason: SDUPTHER

## 2022-01-11 RX ORDER — SODIUM CHLORIDE, SODIUM LACTATE, POTASSIUM CHLORIDE, CALCIUM CHLORIDE 600; 310; 30; 20 MG/100ML; MG/100ML; MG/100ML; MG/100ML
INJECTION, SOLUTION INTRAVENOUS CONTINUOUS
Status: DISCONTINUED | OUTPATIENT
Start: 2022-01-11 | End: 2022-01-11

## 2022-01-11 RX ORDER — GABAPENTIN 600 MG/1
600 TABLET ORAL ONCE
Status: COMPLETED | OUTPATIENT
Start: 2022-01-11 | End: 2022-01-11

## 2022-01-11 RX ORDER — OXYCODONE HYDROCHLORIDE 5 MG/1
5 TABLET ORAL EVERY 4 HOURS PRN
Status: DISCONTINUED | OUTPATIENT
Start: 2022-01-11 | End: 2022-01-11 | Stop reason: HOSPADM

## 2022-01-11 RX ORDER — ONDANSETRON 2 MG/ML
4 INJECTION INTRAMUSCULAR; INTRAVENOUS
Status: DISCONTINUED | OUTPATIENT
Start: 2022-01-11 | End: 2022-01-11 | Stop reason: HOSPADM

## 2022-01-11 RX ORDER — SODIUM CHLORIDE 0.9 % (FLUSH) 0.9 %
10 SYRINGE (ML) INJECTION EVERY 12 HOURS SCHEDULED
Status: DISCONTINUED | OUTPATIENT
Start: 2022-01-11 | End: 2022-01-11 | Stop reason: HOSPADM

## 2022-01-11 RX ORDER — ASPIRIN 81 MG/1
81 TABLET ORAL 2 TIMES DAILY
Status: DISCONTINUED | OUTPATIENT
Start: 2022-01-11 | End: 2022-01-11 | Stop reason: HOSPADM

## 2022-01-11 RX ORDER — SODIUM CHLORIDE 0.9 % (FLUSH) 0.9 %
5-40 SYRINGE (ML) INJECTION PRN
Status: DISCONTINUED | OUTPATIENT
Start: 2022-01-11 | End: 2022-01-11 | Stop reason: HOSPADM

## 2022-01-11 RX ORDER — SODIUM CHLORIDE 0.9 % (FLUSH) 0.9 %
5-40 SYRINGE (ML) INJECTION EVERY 12 HOURS SCHEDULED
Status: DISCONTINUED | OUTPATIENT
Start: 2022-01-11 | End: 2022-01-11 | Stop reason: HOSPADM

## 2022-01-11 RX ORDER — OXYCODONE HYDROCHLORIDE AND ACETAMINOPHEN 5; 325 MG/1; MG/1
2 TABLET ORAL PRN
Status: DISCONTINUED | OUTPATIENT
Start: 2022-01-11 | End: 2022-01-11 | Stop reason: HOSPADM

## 2022-01-11 RX ORDER — ACETAMINOPHEN 500 MG
1000 TABLET ORAL ONCE
Status: COMPLETED | OUTPATIENT
Start: 2022-01-11 | End: 2022-01-11

## 2022-01-11 RX ORDER — PROPOFOL 10 MG/ML
INJECTION, EMULSION INTRAVENOUS CONTINUOUS PRN
Status: DISCONTINUED | OUTPATIENT
Start: 2022-01-11 | End: 2022-01-11 | Stop reason: SDUPTHER

## 2022-01-11 RX ORDER — SCOLOPAMINE TRANSDERMAL SYSTEM 1 MG/1
1 PATCH, EXTENDED RELEASE TRANSDERMAL ONCE
Status: DISCONTINUED | OUTPATIENT
Start: 2022-01-11 | End: 2022-01-11

## 2022-01-11 RX ORDER — DIPHENHYDRAMINE HYDROCHLORIDE 50 MG/ML
12.5 INJECTION INTRAMUSCULAR; INTRAVENOUS
Status: DISCONTINUED | OUTPATIENT
Start: 2022-01-11 | End: 2022-01-11 | Stop reason: HOSPADM

## 2022-01-11 RX ORDER — ACETAMINOPHEN 325 MG/1
650 TABLET ORAL EVERY 6 HOURS
Status: DISCONTINUED | OUTPATIENT
Start: 2022-01-11 | End: 2022-01-11 | Stop reason: HOSPADM

## 2022-01-11 RX ORDER — LIDOCAINE HYDROCHLORIDE 10 MG/ML
INJECTION, SOLUTION EPIDURAL; INFILTRATION; INTRACAUDAL; PERINEURAL PRN
Status: DISCONTINUED | OUTPATIENT
Start: 2022-01-11 | End: 2022-01-11 | Stop reason: SDUPTHER

## 2022-01-11 RX ORDER — ASPIRIN 81 MG/1
81 TABLET ORAL 2 TIMES DAILY
Qty: 90 TABLET | Refills: 1 | Status: SHIPPED | OUTPATIENT
Start: 2022-01-11

## 2022-01-11 RX ORDER — SODIUM CHLORIDE 9 MG/ML
25 INJECTION, SOLUTION INTRAVENOUS PRN
Status: DISCONTINUED | OUTPATIENT
Start: 2022-01-11 | End: 2022-01-11 | Stop reason: HOSPADM

## 2022-01-11 RX ORDER — SODIUM CHLORIDE 0.9 % (FLUSH) 0.9 %
10 SYRINGE (ML) INJECTION PRN
Status: DISCONTINUED | OUTPATIENT
Start: 2022-01-11 | End: 2022-01-11 | Stop reason: HOSPADM

## 2022-01-11 RX ORDER — TRANEXAMIC ACID 100 MG/ML
INJECTION, SOLUTION INTRAVENOUS PRN
Status: DISCONTINUED | OUTPATIENT
Start: 2022-01-11 | End: 2022-01-11 | Stop reason: SDUPTHER

## 2022-01-11 RX ORDER — OXYCODONE HYDROCHLORIDE AND ACETAMINOPHEN 5; 325 MG/1; MG/1
1 TABLET ORAL PRN
Status: DISCONTINUED | OUTPATIENT
Start: 2022-01-11 | End: 2022-01-11 | Stop reason: HOSPADM

## 2022-01-11 RX ORDER — LIDOCAINE HYDROCHLORIDE 10 MG/ML
1 INJECTION, SOLUTION EPIDURAL; INFILTRATION; INTRACAUDAL; PERINEURAL
Status: DISCONTINUED | OUTPATIENT
Start: 2022-01-11 | End: 2022-01-11 | Stop reason: HOSPADM

## 2022-01-11 RX ORDER — SODIUM CHLORIDE, SODIUM LACTATE, POTASSIUM CHLORIDE, CALCIUM CHLORIDE 600; 310; 30; 20 MG/100ML; MG/100ML; MG/100ML; MG/100ML
INJECTION, SOLUTION INTRAVENOUS CONTINUOUS
Status: DISCONTINUED | OUTPATIENT
Start: 2022-01-11 | End: 2022-01-11 | Stop reason: HOSPADM

## 2022-01-11 RX ORDER — OXYCODONE HYDROCHLORIDE AND ACETAMINOPHEN 5; 325 MG/1; MG/1
1-2 TABLET ORAL EVERY 4 HOURS PRN
Qty: 42 TABLET | Refills: 0 | Status: SHIPPED | OUTPATIENT
Start: 2022-01-11 | End: 2022-01-18

## 2022-01-11 RX ORDER — ONDANSETRON 2 MG/ML
4 INJECTION INTRAMUSCULAR; INTRAVENOUS EVERY 6 HOURS PRN
Status: DISCONTINUED | OUTPATIENT
Start: 2022-01-11 | End: 2022-01-11 | Stop reason: HOSPADM

## 2022-01-11 RX ORDER — OXYCODONE HYDROCHLORIDE 10 MG/1
10 TABLET ORAL EVERY 4 HOURS PRN
Status: DISCONTINUED | OUTPATIENT
Start: 2022-01-11 | End: 2022-01-11 | Stop reason: HOSPADM

## 2022-01-11 RX ORDER — CALCIUM CHLORIDE 100 MG/ML
INJECTION INTRAVENOUS; INTRAVENTRICULAR PRN
Status: DISCONTINUED | OUTPATIENT
Start: 2022-01-11 | End: 2022-01-11 | Stop reason: ALTCHOICE

## 2022-01-11 RX ORDER — MIDAZOLAM HYDROCHLORIDE 1 MG/ML
INJECTION INTRAMUSCULAR; INTRAVENOUS PRN
Status: DISCONTINUED | OUTPATIENT
Start: 2022-01-11 | End: 2022-01-11 | Stop reason: SDUPTHER

## 2022-01-11 RX ADMIN — CEFAZOLIN 2000 MG: 10 INJECTION, POWDER, FOR SOLUTION INTRAVENOUS; PARENTERAL at 15:33

## 2022-01-11 RX ADMIN — TRANEXAMIC ACID 1000 MG: 100 INJECTION INTRAVENOUS at 09:22

## 2022-01-11 RX ADMIN — PROPOFOL 50 MCG/KG/MIN: 10 INJECTION, EMULSION INTRAVENOUS at 09:15

## 2022-01-11 RX ADMIN — LIDOCAINE HYDROCHLORIDE 40 MG: 10 INJECTION, SOLUTION EPIDURAL; INFILTRATION; INTRACAUDAL; PERINEURAL at 09:15

## 2022-01-11 RX ADMIN — ACETAMINOPHEN 1000 MG: 500 TABLET ORAL at 07:57

## 2022-01-11 RX ADMIN — SODIUM CHLORIDE, POTASSIUM CHLORIDE, SODIUM LACTATE AND CALCIUM CHLORIDE: 600; 310; 30; 20 INJECTION, SOLUTION INTRAVENOUS at 07:52

## 2022-01-11 RX ADMIN — SODIUM CHLORIDE, POTASSIUM CHLORIDE, SODIUM LACTATE AND CALCIUM CHLORIDE: 600; 310; 30; 20 INJECTION, SOLUTION INTRAVENOUS at 08:52

## 2022-01-11 RX ADMIN — FENTANYL CITRATE 50 MCG: 50 INJECTION, SOLUTION INTRAMUSCULAR; INTRAVENOUS at 09:36

## 2022-01-11 RX ADMIN — DEXAMETHASONE SODIUM PHOSPHATE 10 MG: 10 INJECTION INTRAMUSCULAR; INTRAVENOUS at 07:57

## 2022-01-11 RX ADMIN — BUPIVACAINE HYDROCHLORIDE IN DEXTROSE 2 ML: 7.5 INJECTION, SOLUTION SUBARACHNOID at 09:04

## 2022-01-11 RX ADMIN — GABAPENTIN 600 MG: 600 TABLET, FILM COATED ORAL at 07:57

## 2022-01-11 RX ADMIN — BUPIVACAINE 10 ML: 13.3 INJECTION, SUSPENSION, LIPOSOMAL INFILTRATION at 10:46

## 2022-01-11 RX ADMIN — ACETAMINOPHEN 650 MG: 325 TABLET ORAL at 12:28

## 2022-01-11 RX ADMIN — MIDAZOLAM 2 MG: 1 INJECTION INTRAMUSCULAR; INTRAVENOUS at 08:55

## 2022-01-11 RX ADMIN — BUPIVACAINE HYDROCHLORIDE 10 ML: 5 INJECTION, SOLUTION EPIDURAL; INTRACAUDAL; PERINEURAL at 10:46

## 2022-01-11 RX ADMIN — FENTANYL CITRATE 50 MCG: 50 INJECTION, SOLUTION INTRAMUSCULAR; INTRAVENOUS at 08:55

## 2022-01-11 RX ADMIN — TRANEXAMIC ACID 1000 MG: 100 INJECTION INTRAVENOUS at 09:58

## 2022-01-11 RX ADMIN — CEFAZOLIN SODIUM 2000 MG: 10 INJECTION, POWDER, FOR SOLUTION INTRAVENOUS at 09:07

## 2022-01-11 ASSESSMENT — PULMONARY FUNCTION TESTS
PIF_VALUE: 0
PIF_VALUE: 0
PIF_VALUE: 1
PIF_VALUE: 0
PIF_VALUE: 1
PIF_VALUE: 0
PIF_VALUE: 1
PIF_VALUE: 0
PIF_VALUE: 1
PIF_VALUE: 1
PIF_VALUE: 0
PIF_VALUE: 1
PIF_VALUE: 0
PIF_VALUE: 1
PIF_VALUE: 0
PIF_VALUE: 1
PIF_VALUE: 1
PIF_VALUE: 0
PIF_VALUE: 0
PIF_VALUE: 1
PIF_VALUE: 0
PIF_VALUE: 1
PIF_VALUE: 0
PIF_VALUE: 1
PIF_VALUE: 0
PIF_VALUE: 1
PIF_VALUE: 0
PIF_VALUE: 1
PIF_VALUE: 0
PIF_VALUE: 3
PIF_VALUE: 0
PIF_VALUE: 1
PIF_VALUE: 0
PIF_VALUE: 1
PIF_VALUE: 0

## 2022-01-11 ASSESSMENT — PAIN SCALES - GENERAL
PAINLEVEL_OUTOF10: 1
PAINLEVEL_OUTOF10: 1
PAINLEVEL_OUTOF10: 0
PAINLEVEL_OUTOF10: 0
PAINLEVEL_OUTOF10: 1
PAINLEVEL_OUTOF10: 0
PAINLEVEL_OUTOF10: 1

## 2022-01-11 ASSESSMENT — PAIN DESCRIPTION - PAIN TYPE
TYPE: SURGICAL PAIN
TYPE: SURGICAL PAIN

## 2022-01-11 ASSESSMENT — PAIN DESCRIPTION - LOCATION
LOCATION: KNEE
LOCATION: KNEE

## 2022-01-11 ASSESSMENT — PAIN DESCRIPTION - ORIENTATION
ORIENTATION: LEFT
ORIENTATION: LEFT

## 2022-01-11 ASSESSMENT — PAIN - FUNCTIONAL ASSESSMENT: PAIN_FUNCTIONAL_ASSESSMENT: 0-10

## 2022-01-11 NOTE — ANESTHESIA PRE PROCEDURE
Department of Anesthesiology  Preprocedure Note       Name:  Fabián Braden   Age:  77 y.o.  :  1955                                          MRN:  [id number]         Date:  2022      Surgeon: Luz Marina Kohler):  René Hankins MD    Procedure: Procedure(s):  KNEE TOTAL ARTHROPLASTY    Medications prior to admission:   Prior to Admission medications    Medication Sig Start Date End Date Taking? Authorizing Provider   oxyCODONE-acetaminophen (PERCOCET) 5-325 MG per tablet Take 1-2 tablets by mouth every 4 hours as needed for Pain for up to 7 days. 22 Yes René Hankins MD   aspirin EC 81 MG EC tablet Take 1 tablet by mouth 2 times daily 22  Yes René Hankins MD   COVID-19 Antibody Test KIT 1 kit by In Vitro route once for 1 dose 20  Lennox Caro MD       Current medications:    Current Facility-Administered Medications   Medication Dose Route Frequency Provider Last Rate Last Admin    lactated ringers infusion   IntraVENous Continuous Berta Aranda MD        sodium chloride flush 0.9 % injection 5-40 mL  5-40 mL IntraVENous 2 times per day Berta Aranda MD        sodium chloride flush 0.9 % injection 5-40 mL  5-40 mL IntraVENous PRN Berta Aranda MD        0.9 % sodium chloride infusion  25 mL IntraVENous PRN Berta Aranda MD        lidocaine PF 1 % injection 1 mL  1 mL IntraDERmal Once PRN Berta Aranda MD        ceFAZolin (ANCEF) 2000 mg in dextrose 5 % 50 mL IVPB  2,000 mg IntraVENous On Call to  José Antonio Arthur MD        acetaminophen (TYLENOL) tablet 1,000 mg  1,000 mg Oral Once René Hankins MD        dexamethasone (PF) (DECADRON) injection 10 mg  10 mg IntraVENous Once René Hankins MD        gabapentin (NEURONTIN) tablet 600 mg  600 mg Oral Once René Hankins MD        scopolamine (TRANSDERM-SCOP) transdermal patch 1 patch  1 patch TransDERmal Once René Hankins MD           Allergies:     Allergies   Allergen Reactions  Pravastatin Other (See Comments)     Myalgias         Problem List:    Patient Active Problem List   Diagnosis Code    HLD (hyperlipidemia) E78.5    Arthritis M19.90    History of prostate cancer Z85.46    Partial deafness of both ears H91.93    Skin lesion rt hand wart L98.9    Primary osteoarthritis of left knee M17.12       Past Medical History:        Diagnosis Date    Abnormal EKG     12-28-21    Arthritis     Eye problem     Right- states was seeing floaters, was told problem with the \"jelly\" of eye    Hearing loss     B/l- hearing aids    Hyperlipidemia     LBBB (left bundle branch block)     Per EKG 12-28-21    Left knee DJD     Prostate CA ([address]     Tx in 2010 w/external beam proton radiation, surgery was not done except for prostate biopsies.        Past Surgical History:        Procedure Laterality Date    ABSCESS DRAINAGE      Anal    CARDIAC CATHETERIZATION  01/07/2022    negative    COLONOSCOPY      INGUINAL HERNIA REPAIR Bilateral     KNEE ARTHROSCOPY Left     x2    PROSTATE BIOPSY      Multiple       Social History:    Social History     Tobacco Use    Smoking status: Never Smoker    Smokeless tobacco: Never Used   Substance Use Topics    Alcohol use: Not Currently     Alcohol/week: 5.0 - 6.0 standard drinks     Types: 5 - 6 Standard drinks or equivalent per week     Comment: 5-6 glasses of wine, beer, or burbon per week                                Counseling given: Not Answered      Vital Signs (Current):   Vitals:    01/11/22 0728   BP: (!) 153/93   Pulse: 77   Resp: 16   Temp: 97.8 °F (36.6 °C)   TempSrc: Infrared   SpO2: 96%   Weight: 170 lb (77.1 kg)   Height: 5' 8\" (1.727 m)                                              BP Readings from Last 3 Encounters:   01/11/22 (!) 153/93   12/28/21 (!) 148/81   11/23/21 134/82       NPO Status: Time of last liquid consumption: 2300                        Time of last solid consumption: 2000                        Date of last liquid consumption: 01/10/22                        Date of last solid food consumption: 01/10/22    BMI:   Wt Readings from Last 3 Encounters:   01/11/22 170 lb (77.1 kg)   12/28/21 170 lb (77.1 kg)   11/23/21 185 lb 6.4 oz (84.1 kg)     Body mass index is 25.85 kg/m². CBC:   Lab Results   Component Value Date    WBC 5.7 12/28/2021    RBC 5.12 12/28/2021    RBC 5.12 03/22/2018    RBC 0 03/22/2018    HGB 15.3 12/28/2021    HCT 45.4 12/28/2021    MCV 88.7 12/28/2021    RDW 13.4 12/28/2021     12/28/2021     10/31/2011       CMP:   Lab Results   Component Value Date     12/28/2021    K 4.4 12/28/2021     12/28/2021    CO2 27 12/28/2021    BUN 14 12/28/2021    CREATININE 0.76 12/28/2021    GFRAA >60 12/28/2021    LABGLOM >60 12/28/2021    GLUCOSE 84 12/28/2021    GLUCOSE 85 03/22/2018    PROT 6.6 10/16/2020    CALCIUM 9.6 12/28/2021    BILITOT 0.46 10/16/2020    BILITOT NEGATIVE 03/22/2018    ALKPHOS 45 10/16/2020    AST 16 10/16/2020    ALT 20 10/16/2020       POC Tests: No results for input(s): POCGLU, POCNA, POCK, POCCL, POCBUN, POCHEMO, POCHCT in the last 72 hours.     Coags: No results found for: PROTIME, INR, APTT    HCG (If Applicable): No results found for: PREGTESTUR, PREGSERUM, HCG, HCGQUANT     ABGs: No results found for: PHART, PO2ART, JMC2PHK, LTR1QAH, BEART, Y6XJSIBG     Type & Screen (If Applicable):  No results found for: LABABO, LABRH    Drug/Infectious Status (If Applicable):  No results found for: HIV, HEPCAB    COVID-19 Screening (If Applicable): No results found for: COVID19        Anesthesia Evaluation  Patient summary reviewed and Nursing notes reviewed no history of anesthetic complications:   Airway: Mallampati: II  TM distance: >3 FB   Neck ROM: full  Mouth opening: > = 3 FB Dental: normal exam         Pulmonary:Negative Pulmonary ROS and normal exam  breath sounds clear to auscultation                             Cardiovascular:    (+) CAD ( Minimal nonobstructive coronary artery disease):, hyperlipidemia      ECG reviewed  Rhythm: regular  Rate: normal           Beta Blocker:  Not on Beta Blocker         Neuro/Psych:   (+) neuromuscular disease:,              ROS comment: Partial deafness of both ears - B/l- hearing aids GI/Hepatic/Renal:   (+) renal disease (History of prostate cancer):,           Endo/Other:    (+) : arthritis:., malignancy/cancer (History of prostate cancer). Abdominal:             Vascular: negative vascular ROS. Other Findings:           Anesthesia Plan      general, regional and spinal     ASA 3     (Patient requests spinal anesthesia for the case - risks and benefits discussed  Left Adductor Canal Block PSR post op)  Induction: intravenous. MIPS: Postoperative opioids intended and Prophylactic antiemetics administered. Anesthetic plan and risks discussed with patient. Plan discussed with CRNA.                 Tangela Villanueva MD   1/11/2022

## 2022-01-11 NOTE — ANESTHESIA PROCEDURE NOTES
Peripheral Block    Patient location during procedure: PACU  Start time: 1/11/2022 10:40 AM  End time: 1/11/2022 10:47 AM  Staffing  Performed: anesthesiologist   Anesthesiologist: Yudi Ordonez MD  Preanesthetic Checklist  Completed: patient identified, IV checked, site marked, risks and benefits discussed, surgical consent, monitors and equipment checked, pre-op evaluation, timeout performed, anesthesia consent given, oxygen available and patient being monitored  Peripheral Block  Patient position: supine  Prep: ChloraPrep  Patient monitoring: cardiac monitor, continuous pulse ox and frequent blood pressure checks  Block type: Saphenous  Laterality: left  Injection technique: single-shot  Guidance: ultrasound guided  Provider prep: mask and sterile gloves  Needle  Needle type: short-bevel   Needle gauge: 20 G  Needle length: 10 cm  Test dose: negative  Assessment  Injection assessment: local visualized surrounding nerve on ultrasound, no paresthesia on injection and negative aspiration for heme  Paresthesia pain: none  Slow fractionated injection: yes  Hemodynamics: stable  Medications Administered  Bupivacaine (MARCAINE) PF injection 0.5%, 10 mL  bupivacaine liposome (EXPAREL) injection 1.3%, 10 mL  Reason for block: post-op pain management and at surgeon's request

## 2022-01-11 NOTE — DISCHARGE INSTR - COC
Continuity of Care Form    Patient Name: Sherine Mcpherson   :  1955  MRN:  [id number]    Admit date:  2022  Discharge date:  ***    Code Status Order: Full Code   Advance Directives:      Admitting Physician:  Tram Oglesby MD  PCP: Do Browning MD    Discharging Nurse: [facility] Unit/Room#: 43 [facility]  Discharging Unit Phone Number: ***    Emergency Contact:   Extended Emergency Contact Information  Primary Emergency Contact: Anitra Russell  Address: [address] [place] [address] Phone: [phone]  Relation: Spouse    Past Surgical History:  Past Surgical History:   Procedure Laterality Date    ABSCESS DRAINAGE      Anal    COLONOSCOPY      INGUINAL HERNIA REPAIR Bilateral     KNEE ARTHROSCOPY Left     x2    PROSTATE BIOPSY      Multiple       Immunization History: There is no immunization history on file for this patient. Active Problems:  Patient Active Problem List   Diagnosis Code    HLD (hyperlipidemia) E78.5    Arthritis M19.90    History of prostate cancer Z85.46    Partial deafness of both ears H91.93    Skin lesion rt hand wart L98.9    Primary osteoarthritis of left knee M17.12       Isolation/Infection:   Isolation            No Isolation          Patient Infection Status       None to display            Nurse Assessment:  Last Vital Signs: There were no vitals taken for this visit.     Last documented pain score (0-10 scale):    Last Weight:   Wt Readings from Last 1 Encounters:   21 170 lb (77.1 kg)     Mental Status:  {IP PT MENTAL STATUS:67184}    IV Access:  [facility] DEANA IV ACCESS:[id number]}    Nursing Mobility/ADLs:  Walking   {CHP DME DZLY:911170675}  Transfer  {CHP DME AXMB:177786847}  Bathing  {CHP DME MBXD:810807158}  Dressing  {CHP DME UPTD:411372288}  Toileting  {CHP DME SGKJ:334548338}  Feeding  {CHP DME XGMU:930373203}  Med Admin  {CHP DME CTAR:404634900}  Med Delivery   { DEANA MED Delivery:099439970}    Wound Care Documentation {Prognosis:4236229212}    Recommended Labs or Other Treatments After Discharge: ***    Physician Certification: I certify the above information and transfer of Isi Peralta  is necessary for the continuing treatment of the diagnosis listed and that he requires {Admit to Appropriate Level of Care:58924} for {GREATER/LESS:368278743} 30 days.      Update Admission H&P: {CHP DME Changes in [facility]:058671105}    PHYSICIAN SIGNATURE:  Electronically signed by Jamison Little MD on 1/11/22 at 7:21 AM EST

## 2022-01-11 NOTE — CARE COORDINATION
Merlin Imre U. 12. Encounter Date/Time: 2022 [address] Account: [de-identified]    MRN: [id number]    Patient: Tamera Patel    Contact Serial #: [id number]      ENCOUNTER          Patient Class: OP in a bed Private Enc? No Unit RM BD: [facility] MED ANTONIO    Hospital Service: Med/Surg   Encounter DX: Primary osteoarthritis o*   ADM Provider: Tanya Newman MD   Procedure: IN TOTAL KNEE ARTHROPLAS*   ATT Provider: Tanya Newman MD   REF Provider:        Admission DX: Primary osteoarthritis of left knee and DX codes: M17.12      PATIENT  Name: Tamera Patel : 1955 (66 yrs)   Address: [address] [place] [address] Sex: Male   [place] city: [facility] [address]         Marital Status:    Employer: RETIRED         [place]: [facility]   Primary Care Provider: Raffi Key MD         Primary Phone: [phone]   EMERGENCY CONTACT   Contact Name Legal Guardian? Relationship to Patient Home Phone Work Phone   1. Alisha Rothman  2. *No Contact Specified*      Spouse    [phone]                 GUARANTOR            Guarantor: [facility]     : 1955   Address: [address] [place] [address] Sex: Male     [place] [address]     Relation to Patient: Self       Home Phone: [phone]   Guarantor ID: [id number]       Work Phone:     Guarantor Employer: RETIRED         Status: RETIRED      COVERAGE        PRIMARY INSURANCE   Payor: [facility] MEDICARE Plan: [facility] MEDICARE ADVANTAGE*   Payor Address: [place] [id number] [place] [id number]       Group Number: [id number] Insurance Type: INDEMNITY   Subscriber Name: Sarah Rivero : 1955   Subscriber ID: [id number] Pat. Rel. to Sub: Self   SECONDARY INSURANCE   Payor:   Plan:     Payor Address:  ,           Group Number:   Insurance Type:     Subscriber Name:   Subscriber :     Subscriber ID:   Pat.  Rel. to Sub:             CSN: [id number]              Order Requisition for [facility]  CSN: [id number]   Order Date:  2022             Patient Information: [facility]       :  1955  Age:  77 y.o. Sex:  M  Home Phone: [phone]  Work Phone:   SSN: [id number]  Address:  [address] [place] [address]                    Arya Baker MRN:  [id number]  Facility MRN:  [id number]  PCP: Pamela Bhatia MD  PCP Phone: [phone]           Ordering Dept: [facility]     Site: [facility]  Ph: [phone] Fax: Address: [address], [address] [place] [address] Ordering User: Alexandro Hein RN  Provider ID: [id number]  NPI:                    Test Ordered: [facility]   Code: RWL97   ORD #: [id number]  Associated Diagnosis: Primary osteoarthritis of left knee (M17.12 [ICD-10-CM])  Comments: The patient can be scheduled with any member of the group, including the provider with the first available appointments.    Scheduling Instructions: [facility] at [address], , [address] [place] [address]  [phone]  Please call within 48 hours to schedule your appointment.    Priority  Routine Class  Internal Referral      Order Status    Expected Date    Specimen Source    Collection Date    Collection Time    Occurrences Remaining    Interval            Electronically Signed By  Mary Fontenot MD Date  2022  1:38 PM              Responsible Party [facility],[address]   Relationship Account Type Home Phone   Lianet Christianjulia - [phone]* [address] [place] [address] / [address] Self P/F [phone]   Employer   Work Phone   [address]     Primary Insurance  Insurance/Subscriber ID:  [id number]  [facility] [address] Name:  Lianet Christianjulia              Relationship to Patient: [id number],THOMAS MSelfSigned ABN: N    Payor Name:  He Braun   Plan:  [employer] MEDICARE ADVANTAGE HMO   Group: [id number] [address] [place] [address] Drive [id number]  Worker's Comp Date of Injury:

## 2022-01-11 NOTE — OP NOTE
Operative Note      Patient: Justus Edwards  YOB: 1955  MRN: [id number]    Date of Procedure: 1/11/2022    Pre-Op Diagnosis: DJD LEFT KNEE    Post-Op Diagnosis: Same       Procedure(s):  KNEE TOTAL ARTHROPLASTY    Surgeon(s):  Elis Anne MD    Assistant:   Resident: DO Jorge Alberto Bagley CST  Anesthesia: Spinal    Estimated Blood Loss (mL): Minimal    Complications: None    Specimens:   * No specimens in log *    Implants:  Implant Name Type Inv. Item Serial No.  Lot No. LRB No. Used Action   COMPONENT PAT [id number] THK7. 8MM THN KNEE POLY 3 PEG SER A  COMPONENT PAT [id number] THK7. 8MM THN KNEE POLY 3 PEG SER A  [employer]- [id number] Left 1 Implanted   COMPONENT FEM 67. 5MM L KNEE CO CHROM NP INTLOK SHAWN CRUCE  COMPONENT FEM 67. 5MM L KNEE CO CHROM NP INTLOK SHAWN CRUCE  [employer]- [id number] Left 1 Implanted   TRAY TIB L75MM KNEE CO CHROM FIN MOD INTLOK DAY VANGUARD  TRAY TIB L75MM KNEE CO CHROM FIN MOD INTLOK DAY VANGUARD  [employer]- [id number] Left 1 Implanted   CEMENT BNE 40GM HI VISC RADPQ FOR REV SURG  CEMENT BNE 40GM HI VISC RADPQ FOR REV SURG  [employer]- [id number] Left 1 Implanted   CEMENT BNE 40GM HI VISC RADPQ FOR REV SURG  CEMENT BNE 40GM HI VISC RADPQ FOR REV SURG  [employer]- [id number] Left 1 Implanted   VNGD ANT STAB BRG 13X75  VNGD ANT STAB BRG 13X75  [employer]- [id number] Left 1 Implanted         Drains: * No LDAs found *    Findings: Severe DJD left knee    Detailed Description of Procedure:     Patient is a 77 y.o. male with a long standing history of left DJD of the knee. Patient has failed all types of conservative treatment included physical therapy, weight-loss counseling, NSAIDS, and injections. The patient has significant restriction of activities of daily living and/or work/job place abilities, and, therefore, has been counseled for TKA.  Patient is aware of all the risks and benefits as highlighted in the surgical consent form. The patient was given 2 grams of Ancef in the holding area as well as an adductor canal block. The patient was then taken to the operating suite where spinal anesthesia was administered. A tourniquet was applied to effected leg and then prepped and draped in the usual sterile fashion. Time out was called to verify laterality. The leg was examined   and the tourniquet inflated to 300 mm of Hg. Midline incision was utilized and taken down to the joint capsule where a mid-vastus approach to the knee was performed. After a complete synovectomy, the patella was elevated, calibrated for thickness, and the above sized, patellar triple pegged drills guide positioned medially and then drilled. Atrial patellar button was positioned in order to re-established the original thickness. This was then replaced with a protective patellar plate. The knee was then flexed and revealed significant  arthrosis. Osteophytes were removed via rongeur. A drill hole was made in the distal femur and the femoral canal was then irrigated and suctioned. A fluted IM kristen was inserted and a distal femoral cut was made at 5 degrees of valgus at the +2 setting. The proximal tibia was then exposed after resection of the ACL and lateral meniscus. An extra-medullary tibial cutting jug was then aligned in reference to the tibia tubercle and ankle mortise and positional with a slight posterior slope. The cut was made with minimal cutting of the lowest depth of the tibial wear and the fragment removed. The distal femur was then approached and femoral sizing guide with 3 degrees of external rotation was placed flush with the surface and drill holes made and femoral size determined. The appropriate size cutting jig was then placed and anterior, posterior, and chamfer cuts made with an oscillating saw. A laminar  was inserted with care to avoid damaging the MCL. Posterior osteophytes were removed and the capsule stripped from the posterior femoral condyles. The capsule was then injected with the orthopedic cocktail at this time. The tibial cut was then assessed with a baseplate and alignment kristen to assure proper cut. Spacer blocks were then inserted and the knee was assessed to determine the amount of soft tissue release and osteophyte removal necessary  to establish symmetric flexion and extension gaps. The tibia was then elevated, and the above sized tibial plate was positioned for proper external rotation with an alignment kristen down the middle-third of the tibial tubercles. This was pinned in place, the proximal tibialis reamed, the fins were then repacked. The appropriate size femur was positioned and various size of the polyethylene trials were inserted. The knee was assessed for  ROM and patellar tracking. Satisfied with this, this distal femoral logs were drilled and then all the trial components were removed. The knee was irrigated and dried while the cement was prepared. Cement was applied to to both implant and cut surfaces and the implants impacted and the compression with one size larger poly inserted and excess cement removed. The patella was placed and compressed as well. The knee was placed in full extension and then injected with the remainder  of the 100ml of the orthopedic cocktail. The Irrisept lavage was carried out for the 1 minutes. This was then irrigated and a permanent polyethylene was insert was injected with platelet rich/poor plasma and the tourniquet was released at 37 minutes. Hemostasis was excellent. The knee was closed with a #1 Strata-Fix and vastus with a double-layer of O-Vicryl suture. The subcutaneous tissue closed with a 2-0 Monocryl Strata-Fix  and then a Zip-line used for the skin. This was covered with adaptic and Aquacel dressing and dressed with a large 6-inch Ace dressing from toes to mid-thigh.  The patient was awakened and taken to PACU in good condition.      Electronically signed by Diane Cronin MD on 1/11/2022 at 10:11 AM

## 2022-01-11 NOTE — CARE COORDINATION
Joint Replacement Discharge Planning Note:    Admission Date:  1/11/2022 Tamera Patel is a 77 y.o.  male    Admitted for : Primary osteoarthritis of left knee [M17.12]    Met with:  Patient and Family    PCP:  Raffi Key MD              Insurance:  Aetna Medicare    Current Residence/ Living Arrangements:  independently at home             Current Services PTA:  No    Is patient agreeable to [address]: No    Is patient agreeable to outpatient physical therapy:  Yes    Freedom of choice provided: Yes         [address] Agency/Outpatient Therapy chosen:  [address] [facility] needed: No    Current home DME:  walker    Pharmacy:  [facility]    Does Patient want to use MEDS to BEDS?([facility] only) Yes    Transportation Provider:  Patient and Family                       Discharge Plan:   Patient intends to discharge to Home    Patient does not need a wheeled walker. Anticipated discharge date 1/11/22      Readmission Risk              Risk of Unplanned Readmission:  0           Electronically signed by: Lyric Youngblood RN on 1/11/2022 at 1:36 PM    Faxed order for OP PT to [facility].     Electronically signed by Lyric Youngblood RN on 1/11/2022 at 1:39 PM

## 2022-01-11 NOTE — INTERVAL H&P NOTE
Update History & Physical    The patient's History and Physical of December 28, 2021 was reviewed with the patient and I examined the patient. There was no change. The surgical site was confirmed by the patient and me. Pt will have KNEE TOTAL ARTHROPLASTY- LEFT per Dr. Jorge Solis. Pt states that he had Abnormal EKG w/LBBB and he had cardiac cath last Friday 1/7/22 and he diagnosed with left bundle branch blocked and his cardiologist clear him for the surgery   Pt denies fever/chills, chest pain or SOB, no palpitation or dizziness, leg swelling or headache   Pt NPO since the past midnight, no am medication today   Denies hx of MRSA infection. Denies hx of blood clots. Denies hx of any personal or family hx of complications w/anesthesia. + anxiety    See nursing flow sheet for vital signs     Lab Results   Component Value Date    WBC 5.7 12/28/2021    HGB 15.3 12/28/2021    HCT 45.4 12/28/2021    MCV 88.7 12/28/2021     12/28/2021     Lab Results   Component Value Date     12/28/2021    K 4.4 12/28/2021     12/28/2021    CO2 27 12/28/2021    BUN 14 12/28/2021    CREATININE 0.76 12/28/2021    GLUCOSE 84 12/28/2021    GLUCOSE 85 03/22/2018    CALCIUM 9.6 12/28/2021      Lab Results   Component Value Date    APPEARANCE TURBID 03/22/2018    COLORU Yellow 12/28/2021    NITRU NEGATIVE 12/28/2021    GLUCOSEU NEGATIVE 12/28/2021    KETUA NEGATIVE 12/28/2021    UROBILINOGEN Normal 12/28/2021    BILIRUBINUR NEGATIVE 12/28/2021    OCBU NEGATIVE 03/22/2018       Plan: The risks, benefits, expected outcome, and alternative to the recommended procedure have been discussed with the patient. Patient understands and wants to proceed with the procedure.      Electronically signed by ADONIS Gillis CNP on 1/11/2022 at 7:06 AM

## 2022-01-11 NOTE — ANESTHESIA POSTPROCEDURE EVALUATION
Department of Anesthesiology  Postprocedure Note    Patient: Estevan Hatchet  MRN: [id number]  YOB: 1955  Date of evaluation: 1/11/2022  Time:  12:20 PM     Procedure Summary     Date: 01/11/22 Room / Location: [address] [place] [address]  / Susan    Anesthesia Start: 9361 Anesthesia Stop: 1034    Procedure: KNEE TOTAL ARTHROPLASTY (Left Knee) Diagnosis: (DJD LEFT KNEE)    Surgeons: Sarah Mendoza MD Responsible Provider: Tali Haines MD    Anesthesia Type: general, regional, spinal ASA Status: 3          Anesthesia Type: general, regional, spinal    Mras Phase I: Mars Score: 10    Mars Phase II:      Last vitals: Reviewed and per EMR flowsheets.        Anesthesia Post Evaluation    Comments: POST- ANESTHESIA EVALUATION       Pt Name: Estevan Hatchet  MRN: [id number]  [place]: 1955  Date of evaluation: 1/11/2022  Time:  12:20 PM      /74   Pulse 86   Temp 97.4 °F (36.3 °C) (Oral)   Resp 13   Ht 5' 8\" (1.727 m)   Wt 170 lb (77.1 kg)   SpO2 94%   BMI 25.85 kg/m²      Consciousness Level  Awake  Cardiopulmonary Status  Stable  Pain Adequately Treated YES  Nausea / Vomiting  NO  Adequate Hydration  YES  Anesthesia Related Complications NONE      Electronically signed by Tali Haines MD on 1/11/2022 at 12:20 PM

## 2022-01-11 NOTE — PROGRESS NOTES
[facility]    Occupational Therapy Evaluation  Date: 22  Patient Name: Mercedez Odom       Room: 3613/3810-34  MRN: [id number]  Account: [de-identified]   : 1955  (77 y.o.) Gender: male     Discharge Recommendations: The patient may need non-skilled ADL assistance after discharge. Referring Practitioner: Michael Deras MD  Diagnosis: Primary OA Left Knee       Treatment Diagnosis: Impaired self care status  Past Medical History:  has a past medical history of Abnormal EKG, Arthritis, Eye problem, Hearing loss, Hyperlipidemia, LBBB (left bundle branch block), Left knee DJD, and Prostate CA ([address] Past Surgical History:   has a past surgical history that includes Colonoscopy; Inguinal hernia repair (Bilateral); Prostate biopsy; Abscess Drainage; Knee arthroscopy (Left); Cardiac catheterization (2022); and Total knee arthroplasty (Left, 2022).     Restrictions  Restrictions/Precautions: Weight Bearing,Surgical Protocols,Up as Tolerated  Implants present? : Metal implants (L TKA)  Required Braces or Orthoses?: No     Vitals  Temp: 97.4 °F (36.3 °C)  Pulse: 86  Resp: 13  BP: 137/74  Height: 5' 8\" (172.7 cm)  Weight: 170 lb (77.1 kg)  BMI (Calculated): 25.9  Oxygen Therapy  SpO2: 94 %  Pulse Oximeter Device Mode: Continuous  Pulse Oximeter Device Location: Right,Hand,Finger  O2 Device: None (Room air)  Level of Consciousness: Alert (0)    Subjective  Subjective: Pt resting in bed with wife in room upon arrival. Pt was pleasant and agreeable to OT/PT eval  Comments: Ok per Pee France for OT/PT eval  Overall Orientation Status: Within Functional Limits  Vision  Vision: Impaired  Vision Exceptions: Wears glasses at all times  Hearing  Hearing: Exceptions to [facility]  Hearing Exceptions: Bilateral hearing aid,Hard of hearing/hearing concerns  Social/Functional History  Lives With: Spouse  Type of Home: House  Home Layout: One level (2 rooms with a step down)  Home Access: Stairs to enter without rails  Entrance Stairs - Number of Steps: 2 CYNTHIA garage; 4-5 CYNTHIA in side door with HR  Bathroom Shower/Tub: Walk-in shower,Doors  Bathroom Toilet: Standard  Bathroom Equipment: Built-in shower seat,Hand-held shower  Home Equipment: Rolling walker  ADL Assistance: Independent  Homemaking Assistance: Independent  Homemaking Responsibilities: Yes  Ambulation Assistance: Independent  Transfer Assistance: Independent  Active : Yes  Mode of Transportation: [place]  Occupation: Retired  Type of occupation:   IADL Comments: Pt reports sleeping in flat bed  Additional Comments: Pt reports that wife is retired and is able to assist as needed. Pain Assessment  Pain Assessment: 0-10  Pain Level: 1  Pain Type: Surgical pain  Pain Location: Knee  Pain Orientation: Left    Objective          Sensation  Overall Sensation Status: Impaired (Slight numbness in B feet)   ADL  Feeding: Independent  Grooming: Setup  UE Bathing: Stand by assistance  LE Bathing: Contact guard assistance  UE Dressing: Stand by assistance  LE Dressing: Contact guard assistance  Toileting: Contact guard assistance  Additional Comments: ADL scores based on clinical reasoning and skilled observation unless otherwise noted. OT facilitated pts engagement in dressing tasks on this date. Pt educated on adaptive dressing technique of dressing surgical leg first with good carryover. Pt educated on adaptive equipment such as sock aid and reacher to increase safety and independence with self care tasks with good understanding.      UE Function           LUE Strength  Gross LUE Strength: WFL  L Hand General: 4+/5     LUE Tone: Normotonic     LUE AROM (degrees)  LUE AROM : WFL     Left Hand AROM (degrees)  Left Hand AROM: WFL  RUE Strength  Gross RUE Strength: WFL  R Hand General: 4+/5      RUE Tone: Normotonic     RUE AROM (degrees)  RUE AROM : WFL     Right Hand AROM (degrees)  Right Hand AROM: WFL    Fine Motor Skills  Coordination  Movements Are Fluid And Coordinated: Yes                           Mobility  Supine to Sit: Stand by assistance  Sit to Supine: Unable to assess (Pt sitting in chair at end of session)       Balance  Sitting Balance: Stand by assistance  Standing Balance: Contact guard assistance  Standing Balance  Time: 1-2 minutes x 2; 3-4 minutes x 1  Activity: functional transfers/mobility, lower body dressing  Comment: with RW  Functional Mobility  Functional - Mobility Device: Rolling Walker  Activity: Other (hallway)  Assist Level: Contact guard assistance  Functional Mobility Comments: Verbal cues for hand placement, safety, and walker management  Bed mobility  Supine to Sit: Stand by assistance  Sit to Supine: Unable to assess (Pt sitting in chair at end of session)  Scooting: Stand by assistance  Comment: Bed mobility completed with HOB flat     Transfers  Sit to stand: Contact guard assistance  Stand to sit: Contact guard assistance  Transfer Comments: Verbal cues for hand placement and safety  Functional Activity Tolerance  Functional Activity Tolerance:  Tolerates 30 min exercise with multiple rests     Assessment  Assessment  Performance deficits / Impairments: Decreased ADL status,Decreased functional mobility ,Decreased strength,Decreased balance,Decreased endurance,Decreased high-level IADLs  Treatment Diagnosis: Impaired self care status  Prognosis: Good  Decision Making: Medium Complexity  REQUIRES OT FOLLOW UP: Yes  Activity Tolerance: Patient Tolerated treatment well         Functional Outcome Measures  AM-PAC Daily Activity Inpatient   How much help for putting on and taking off regular lower body clothing?: A Little  How much help for Bathing?: A Little  How much help for Toileting?: A Little  How much help for putting on and taking off regular upper body clothing?: A Little  How much help for taking care of personal grooming?: A Little  How much help for eating meals?: None  AM-PAC Inpatient Daily Activity Raw Score: 19  AM-PAC Inpatient ADL T-Scale Score : 40.22  ADL Inpatient CMS 0-100% Score: 42.8  ADL Inpatient CMS G-Code Modifier : CK       Goals  Patient Goals   Patient goals : To go home  Short term goals  Time Frame for Short term goals: By discharge  Short term goal 1: Pt will complete BADLs with modifed independence and good safety  Short term goal 2: Pt will complete functional transfers/mobility during self care tasks with modified independence and good safety  Short term goal 3: Pt will verbalize/demonstrate good understanding of adaptive equipment/adaptive strategies/durable medical equipment to increase safety and independence with self care and mobility  Short term goal 4: Pt will verbalize/demonstrate good understanding of home safety/fall prevention strategies to increase safety and independence with self care and mobility    Plan  Safety Devices  Safety Devices in place: Yes  Type of devices:  All fall risk precautions in place,Call light within reach,Gait belt,Patient at risk for falls,Nurse notified,Left in chair     Plan  Times per week: 5-7 (1-2 times per day)  Current Treatment Recommendations: Self-Care / ADL,Strengthening,Balance Training,Functional Mobility Training,Endurance Training,Patient/Caregiver Education & Training,Equipment Evaluation, Education, & procurement,Safety Education & Training,Home Management Training          OT Individual Minutes  Time In: 1953  Time Out: 9224  Minutes: 43    Electronically signed by Cas Joy OT on 1/11/22 at 2:05 PM EST

## 2022-01-11 NOTE — PROGRESS NOTES
Physical Therapy    Facility/Department: [facility] MED SURG  Initial Assessment    NAME: Merlin Divers  : 1955  MRN: [id number]    Date of Service: 2022    Discharge Recommendations:  Patient would benefit from continued therapy after discharge   PT Equipment Recommendations  Equipment Needed: No    Assessment   Body structures, Functions, Activity limitations: Decreased functional mobility   Assessment: Impaired mobility due to L TKA  Decision Making: Low Complexity  History: DJD  Exam: decreased mobility  Clinical Presentation: stable  PT Education: Gait Training  Patient Education: Instructed in stairs with good demp and understanding  No Skilled PT: Safe to return home  REQUIRES PT FOLLOW UP: No  Activity Tolerance  Activity Tolerance: Patient Tolerated treatment well       Patient Diagnosis(es): The encounter diagnosis was Primary osteoarthritis of left knee. has a past medical history of Abnormal EKG, Arthritis, Eye problem, Hearing loss, Hyperlipidemia, LBBB (left bundle branch block), Left knee DJD, and Prostate CA ([facility] [address] has a past surgical history that includes Colonoscopy; Inguinal hernia repair (Bilateral); Prostate biopsy; Abscess Drainage; Knee arthroscopy (Left); Cardiac catheterization (2022); and Total knee arthroplasty (Left, 2022).     Restrictions  Restrictions/Precautions  Restrictions/Precautions: Weight Bearing,Surgical Protocols,Up as Tolerated  Required Braces or Orthoses?: No  Implants present? : Metal implants (L TKA)        Subjective  General  Patient assessed for rehabilitation services?: Yes  Family / Caregiver Present: Yes  Follows Commands: Within Functional Limits  Subjective  Subjective: pt alert and cooperative  Pain Screening  Patient Currently in Pain: Yes  Pain Assessment  Pain Assessment: 0-10  Pain Level: 1  Pain Type: Surgical pain  Pain Location: Knee  Pain Orientation: Left  Vital Signs  Patient Currently in Pain: Yes Orientation  Orientation  Overall Orientation Status: Within Normal Limits  Social/Functional History  Social/Functional History  Lives With: Spouse  Type of Home: House  Home Layout: One level (2 rooms with a step down)  Home Access: Stairs to enter without rails  Entrance Stairs - Number of Steps: 2 CYNTHIA garage; 4-5 CYNTHIA in side door with HR  Bathroom Shower/Tub: Walk-in shower,Doors  Bathroom Toilet: Standard  Bathroom Equipment: Built-in shower seat,Hand-held shower  Home Equipment: Rolling walker  ADL Assistance: Independent  Homemaking Assistance: Independent  Homemaking Responsibilities: Yes  Ambulation Assistance: Independent  Transfer Assistance: Independent  Active : Yes  Mode of Transportation: Frankly  Occupation: Retired  Type of occupation:   IADL Comments: Pt reports sleeping in flat bed  Additional Comments: Pt reports that wife is retired and is able to assist as needed.         Objective          AROM RLE (degrees)  RLE AROM: WNL  AROM LLE (degrees)  LLE AROM : WNL (hip/ankle)  L Knee Flexion 0-145: 95  L Knee Extension 0: 0  Strength RLE  Strength RLE: WNL  Strength LLE  Strength LLE: WFL (hip/ankle)  L Knee Flexion: 3+/5  L Knee Extension: 3/5     Sensation  Overall Sensation Status: Impaired (Slight numbness in B feet)  Bed mobility  Supine to Sit: Stand by assistance  Sit to Supine:  (left up in chair)  Scooting: Supervision  Comment: bed flat  Transfers  Sit to Stand: Contact guard assistance  Stand to sit: Contact guard assistance  Bed to Chair: Contact guard assistance (with RW)  Ambulation  Ambulation?: Yes  Ambulation 1  Surface: level tile  Device: Rolling Walker  Assistance: Contact guard assistance  Gait Deviations: Decreased step length  Distance: 100ft x 2  Stairs/Curb  Stairs?: Yes  Stairs  # Steps : 4  Rails: Right ascending  Assistance: Contact guard assistance     Balance  Sitting - Static: Good  Sitting - Dynamic: Good  Standing - Static: Fair;+ (SBA)  Standing - Dynamic: Fair (CGA)        Plan   Plan  Plan Comment: No further in-pt PT needed at this time       Therapy Time   Individual Concurrent Group Co-treatment   Time In 1229         Time Out 1312         Minutes [address] [place], [place]

## 2022-01-11 NOTE — ANESTHESIA PROCEDURE NOTES
Spinal Block    Patient location during procedure: OR  Reason for block: primary anesthetic  Staffing  Performed: resident/CRNA   Resident/CRNA: ADONIS Quinn CRNA  Preanesthetic Checklist  Completed: patient identified, IV checked, site marked, risks and benefits discussed, surgical consent, monitors and equipment checked, pre-op evaluation, timeout performed, anesthesia consent given, oxygen available and patient being monitored  Spinal Block  Patient position: sitting  Prep: Betadine  Patient monitoring: continuous pulse ox and frequent blood pressure checks  Approach: midline  Location: L2/L3 (attempts at L3/L4 without success )  Provider prep: mask and sterile gloves  Local infiltration: lidocaine  Agent: bupivacaine  Dose: 2  Dose: 2  Needle  Needle type:  Emi   Needle gauge: 21 G (attempt times 2 with 25 Pencan L3/L4 without success bone)  Needle length: 3.5 in  Assessment  Sensory level: T10  Events: clear free flowing CSF, negative heme, negative paresthesia  Swirl obtained: Yes  CSF: clear  Attempts: 2  Hemodynamics: stable

## 2022-01-11 NOTE — FLOWSHEET NOTE
Discharge instructions reviewed with the patient and his wife. All questions answered. Patient discharged with anti embolism hose and instructed on use. Patient also discharged with the meds filled by meds to beds.   Assisted into the car without difficulty

## 2022-01-12 ENCOUNTER — HOSPITAL ENCOUNTER (OUTPATIENT)
Dept: PHYSICAL THERAPY | Age: 67
Setting detail: THERAPIES SERIES
Discharge: HOME | End: 2022-01-12
Payer: MEDICARE

## 2022-01-12 PROCEDURE — 97110 THERAPEUTIC EXERCISES: CPT

## 2022-01-12 PROCEDURE — 97161 PT EVAL LOW COMPLEX 20 MIN: CPT

## 2022-01-12 NOTE — PROGRESS NOTES
[address]  [address] [facility]         Phone: [phone]       Fax: [phone]    Physical Therapy Lower Extremity Evaluation    Date:  2022  Patient: Monet Torrez  : 1955  MRN: [id number]  Physician: Aarti Kearns MD     Insurance: [employer] ([address] [place] [address])  Medical Diagnosis: M17.12 (ICD-10-CM) - Primary osteoarthritis of left knee   Rehab Codes:Z96.652 ,R26.2  Onset date:22  Next Dr's appt. : 22  Visit Count: 1/10   Cancel/No Show:0/0    Subjective:   HPI: (onset date) Patient underwent left TKA yesterday by Dr Isabella Billings secondary to longstanding OA resulting in pain and limited function. Patient arrives ambulating with rolling walker. Patient has concerns about his compression socks and also is wondering if he is going up and down the stairs as there are no railings. Patient is going to [facility] early-mid February and he is hoping to be able to walk without the walker by then. Patient was very active prior to surgery and very motivated to get back to [facility].        PMHx:  Arthritis     Hyperlipidemia     Left knee DJD     Prostate CA ([facility] [address]   Left BBB     Medications: [x] Refer to full medical record [] None [] Other:  Allergies:      [x] Refer to full medical record [] None [] Other:    Function:  Hand Dominance  [x] Right  [x] Left  Working:  [] Normal Duty  [] Light Duty  [] Off D/T Condition  [x] Retired    [] Not Employed    []  Disability  [] Other:           Return to work:       ADL/IADL Previous level of function Current level of function Who currently assists the patient with task   Bathing  [x] Independent  [] Assist [x] Independent  [] Assist    Dress/grooming [x] Independent  [] Assist [] Independent  [x] Assist Wife to help with socks    Transfer/mobility [x] Independent  [] Assist [x] Independent  [] Assist    Feeding [x] Independent  [] Assist [x] Independent  [] Assist    Toileting [x] Independent  [] Assist [x] Independent  [] Assist    Driving [x] Independent  [] Assist [] Independent  [x] Assist Wife currently    Housekeeping [x] Independent  [] Assist [] Independent  [x] Assist wife   Grocery shop/meal prep [x] Independent  [] Assist [] Independent  [x] Assist Wife      Gait Prior level of function Current level of function    [x] Independent  [] Assist [x] Independent  [] Assist   Device: [x] Independent [x] Independent    [] Straight Cane [] Quad cane [] Straight Cane [] Quad cane    [] Standard walker [] Rolling walker   [] 4 wheeled walker [] Standard walker [x] Rolling walker   [] 4 wheeled walker    [] Wheelchair [] Wheelchair     Pain:  [x] Yes  [] No Location: L knee Pain Rating: (0-10 scale) 3/10 current  (6/10- at the most thus far). Pain altered Tx:  [] Yes  [] No  Action:  Symptoms:  [x] Improving [] Worsening [] Same  Sleep: [x] OK    [] Disturbed    Objective:    ROM  ° A/P STRENGTH    Left Right Left Right   Hip Flex   5 5   Ext   4 4+   ER       IR       ABD   4 4+   ADD       Knee Flex 100 WFL 4+ 5   Ext -5  WFL 4+ 5   Ankle DF   5 5   PF       INV       EVER                 Observation:    Gait: Patient ambulates with good speed with rolling walker and reciprocal gait pattern with decreased left knee extension in left stance phase and limited L knee flexion in L swing as to be expected s/p TKA post op day 1. Stair negotiation completed with SC with 3 trials of ascending and descending to simulate home set for patient to enter/exit home safely. Assessment: [x] Patient would continue to benefit from skilled physical therapy services in order to improve L knee ROM, LE strength, and restore function as patient scored a  53% disability score on KOOS JR. Knee survey. Patient currently lacks L knee ROM that alters his gait and stair negotiation. He has adequate gross LE strength but his strength is limited functionally with decline in quadriceps eccentric control.   He is very active and will benefit for PT to return to PLOF with least amount of compensation or restriction. LTG: (to be met in 10 treatments)  1. ? Pain: to 2/10 at most to ease completion with ADLs. 2. ? ROM: of L knee to 0-120 degrees to facilitate normal gait and negotiate steps. 3. ? Strength: of L LE to 5/5 to assist in completions of functional tasks without compensation. 4. ? Function: Improve KOOS JR score to <15% disability. 5. Patient to improve functional LE strength with completion 4\" heel tap with good quad control and no evidence of genu valgum. 6. Independent with Home Exercise Programs                  Patient goals: To get as normal as possible to maintain/return to his active lifestyle. Functional Assessment Used: KOOS Jr  Current Status: Score 53% disability   Goal Status: Score <15%     Evaluation Complexity:  History (Personal factors, comorbidities) [] 0 [x] 1-2 [] 3+   Exam (limitations, restrictions) [] 1-2 [x] 3 [] 4+   Clinical presentation (progression) [x] Stable [] Evolving  [] Unstable   Decision Making [x] Low [] Moderate [] High    [x] Low Complexity [] Moderate Complexity [] High Complexity       Rehab Potential:  [x] Good  [] Fair  [] Poor   Suggested Professional Referral:  [x] No  [] Yes:  Barriers to Goal Achievement[de-identified]  [x] No  [] Yes:  Domestic Concerns:  [x] No  [] Yes:    Pt. Education:  [x] Plans/Goals, Risks/Benefits discussed  [x] Home exercise program    Method of Education: [x] Verbal  [x] Demo  [x] Writte  Assisted and educated patient on the donning of compression socks and the following exercises for HEP(see below).   Exercises  Sidelying Hip Abduction - 1 x daily - 7 x weekly - 3 sets - 10 reps  Supine Active Straight Leg Raise - 1 x daily - 7 x weekly - 3 sets - 10 reps  Supine Quad Set - 1 x daily - 7 x weekly - 3 sets - 10 reps  Seated Long Arc Quad - 1 x daily - 7 x weekly - 3 sets - 10 reps  Prone Hip Extension - 1 x daily - 7 x weekly - 3 sets - 10 reps  Supine Knee Extension Strengthening - 1 x daily - 7 x weekly - 3 sets - 10 reps    Comprehension of Education:  [x] Verbalizes understanding. [x] Demonstrates understanding. [] Needs Review. [] Demonstrates/verbalizes understanding of HEP/Ed previously given. Treatment Plan:  [x] Therapeutic Exercise   86851  [] Iontophoresis: 4 mg/mL Dexamethasone Sodium Phosphate  mAmin  22124   [] Therapeutic Activity  07014 [x] Vasopneumatic cold with compression  49207    [x] Gait Training   80021 [] Ultrasound   56391   [x] Neuromuscular Re-education  10336 [] Electrical Stimulation Unattended  83526   [x] Manual Therapy  10619 [] Electrical Stimulation Attended  10656   [x] Instruction in HEP  [] Lumbar/Cervical Traction  18411   [] Aquatic Therapy   22225 [] Cold/hotpack    [] Massage   81538      [] Dry Needling, 1 or 2 muscles  20179   [] Biofeedback, first 15 minutes   57832  [] Biofeedback, additional 15 minutes   29047 [] Dry Needling, 3 or more muscles  03660         Frequency:  2x/week for 10 visits        Todays Treatment:  Modalities: Vasocompression to L knee post exercise to decrease edema and pain. Precautions:  Exercises:  Exercise Reps/ Time Weight/ Level Comments   Supine      Quad sets       SAQ      Heel slides*      SLR      SL hip abduction      Prone hip extension       Seated      LAQ                  Standing      TKE   TB     Step ups       Heel taps (eccentric quad Left)       Total gym squats             Other:    Specific Instructions for next treatment: progress L knee ROM and incorporate functional strengthening with step ups, squats, etc. Vaso post ex.      Treatment Charges: Mins Units   [] Evaluation       []  Low       [x]  Moderate       []  High 30 1   []  Modalities     [x]  Ther Exercise 23 2   []  Manual Therapy     []  Ther Activities     []  Aquatics     []  Neuromuscular     []  Gait Training     []  Dry Needling           1-2 muscles     []  Dry Needling           3 or more muscles     [] Vasocompression     []  Other         TOTAL TREATMENT TIME: 53 minutes     Time in:1008am   Time Out:11:04am     Electronically signed by: Alma Antonio PT        Physician Signature:________________________________Date:__________________  By signing above or cosigning this note, I have reviewed this plan of care and certify a need for medically necessary rehabilitation services.      *PLEASE SIGN ABOVE AND FAX BACK ALL PAGES*

## 2022-01-13 ENCOUNTER — TELEPHONE (OUTPATIENT)
Dept: ORTHOPEDIC SURGERY | Age: 67
End: 2022-01-13

## 2022-01-13 DIAGNOSIS — G89.29 CHRONIC PAIN OF LEFT KNEE: ICD-10-CM

## 2022-01-13 DIAGNOSIS — M79.605 LEFT LEG PAIN: ICD-10-CM

## 2022-01-13 DIAGNOSIS — Z96.652 STATUS POST TOTAL KNEE REPLACEMENT, LEFT: Primary | ICD-10-CM

## 2022-01-13 DIAGNOSIS — M25.562 CHRONIC PAIN OF LEFT KNEE: ICD-10-CM

## 2022-01-14 ENCOUNTER — HOSPITAL ENCOUNTER (OUTPATIENT)
Dept: PHYSICAL THERAPY | Age: 67
Setting detail: THERAPIES SERIES
Discharge: HOME | End: 2022-01-14
Payer: MEDICARE

## 2022-01-14 ENCOUNTER — HOSPITAL ENCOUNTER (OUTPATIENT)
Dept: VASCULAR LAB | Age: 67
Discharge: HOME | End: 2022-01-14
Payer: MEDICARE

## 2022-01-14 DIAGNOSIS — Z96.652 STATUS POST TOTAL KNEE REPLACEMENT, LEFT: ICD-10-CM

## 2022-01-14 DIAGNOSIS — M79.605 LEFT LEG PAIN: ICD-10-CM

## 2022-01-14 DIAGNOSIS — M25.562 CHRONIC PAIN OF LEFT KNEE: ICD-10-CM

## 2022-01-14 DIAGNOSIS — G89.29 CHRONIC PAIN OF LEFT KNEE: ICD-10-CM

## 2022-01-14 PROCEDURE — 93971 EXTREMITY STUDY: CPT

## 2022-01-14 PROCEDURE — 97110 THERAPEUTIC EXERCISES: CPT

## 2022-01-14 NOTE — FLOWSHEET NOTE
[facility]   [address]   Phone: [phone]   Fax: [phone]    Physical Therapy Daily Treatment Note      Date:  2022  Patient Name:  Lucia Blanco    :  1955  MRN: [id number]  Physician: Lucy Olson MD                           Insurance: [address] [place], [place] [address] ([address] [place] [address])  Medical Diagnosis: M17.12 (ICD-10-CM) - Primary osteoarthritis of left knee         Rehab Codes:Z96.652 ,R26.2  Onset date:22            Next 's appt. : 22  Visit Count: 1/10                           Cancel/No Show:0/0  Visit# / total visits: 210  Cancels/No Shows: 0/0    Subjective:    Pain:  [x] Yes  [] No Location: L knee Pain Rating: (0-10 scale) 4/10, up to 7/10 without medication  Pain altered Tx:  [] No  [] Yes  Action:  Comments: Patient arrived today with several general questions regarding recovery from surgery and overall treatment. Patient arrived with intermittent compression sleeves that he received from another individual and questioned use to avoid DVT. Reported pain levels as recorded and expressed concern with amount of pain/swelling he is currently experiencing in BLE. Objective:  Modalities:   Precautions:  Exercises:  Exercise Reps/ Time Weight/ Level Comments   Supine         Quad sets   20x 5\" hold       SAQ         Heel slides*  20x 3\" hold       SLR 20x       SL hip abduction         Prone hip extension          Seated         LAQ  20x, 3\" hold                           Standing         TKE   20x 3\"  Green     Step ups: Forward/Lateral 20x 6\"     Heel taps (eccentric quad Left)   20x 4\"  Min UE support   Total gym squats           Mini Squats 20x         Other:    PROM L knee: Flexion 119, extension -2    Specific Instructions for next treatment:      Assessment: [x] Progressing toward goals. Extensive time spent on patient education this date answering questions/concerns about overall response to surgery.  Educated patient at this time that compression stockings and exercises such as ankle pumps should be sufficient in preventing DVT and advised pt to follow up with physician regarding use of compression sleeves shown during visit today. Assess patient's LLE for any \"red flag\" symptoms of DVT with patient denying any point tenderness in gastroc/soleus with palpation and squeeze. Advised patient that swelling into lower leg and quad is very common 1 week post op. Patient also had question regarding level of activity. Reported that he went for approx 15 min walk on 1/12 and questioned appropriateness. Advised pt that walking for for exercise is encouraged and to let pain/soreness be guide as to physical limitations at this point. Patient verbalized understanding of questions answered. Exercises perforemed per chart above to work on LLE strength and ROM. Notable improvement in ROM this date compared to eval as measured above. Cueing provided with standing exercises to encourage min use of UE for support to maximize LE strengthening potential. Patient reported increased \"tightness\" in L knee following exercises today. [] No change. [] Other:    [x] Patient would continue to benefit from skilled physical therapy services in order to: improve L knee ROM, LE strength, and restore function as patient scored a  53% disability score on KOOS JR. Knee survey. LTG: (to be met in 10 treatments)  1. ? Pain: to 2/10 at most to ease completion with ADLs. 2. ? ROM: of L knee to 0-120 degrees to facilitate normal gait and negotiate steps. 3. ? Strength: of L LE to 5/5 to assist in completions of functional tasks without compensation. 4. ? Function: Improve KOOS JR score to <15% disability. 5. Patient to improve functional LE strength with completion 4\" heel tap with good quad control and no evidence of genu valgum. 6. Independent with Home Exercise Programs                   Patient goals:  To get as normal as possible to maintain/return to his active lifestyle. Pt. Education:  [] Yes  [] No  [] Reviewed Prior HEP/Ed  Method of Education: [] Verbal  [] Demo  [] Written  Comprehension of Education:  [] Verbalizes understanding. [] Demonstrates understanding. [] Needs review. [] Demonstrates/verbalizes HEP/Ed previously given. Plan: [] Continue per plan of care.    [] Other:      Treatment Charges: Mins Units   []  Modalities     [x]  Ther Exercise 54 4   []  Manual Therapy     []  Ther Activities     []  Aquatics     []  Neuromuscular     [] Vasocompression     [] Gait Training     [] Dry needling        [] 1 or 2 muscles        [] 3 or more muscles     []  Other     Total Treatment time 54 4     Time In: 8:44 am           Time Out: 9:40 am    Electronically signed by:  Vita Landau, PTA

## 2022-01-14 NOTE — TELEPHONE ENCOUNTER
Patient calling in stating was not able to get the Doppler scheduled until 1/17/22, unless it was ordered STAT. I put new order in for doppler as STAT. Patient will call back over to scheduling to get scheduled for today.

## 2022-01-19 ENCOUNTER — HOSPITAL ENCOUNTER (OUTPATIENT)
Dept: PHYSICAL THERAPY | Age: 67
Setting detail: THERAPIES SERIES
Discharge: HOME | End: 2022-01-19
Payer: MEDICARE

## 2022-01-19 ENCOUNTER — OFFICE VISIT (OUTPATIENT)
Dept: ORTHOPEDIC SURGERY | Age: 67
End: 2022-01-19

## 2022-01-19 DIAGNOSIS — Z96.652 STATUS POST TOTAL LEFT KNEE REPLACEMENT: Primary | ICD-10-CM

## 2022-01-19 PROCEDURE — 97110 THERAPEUTIC EXERCISES: CPT

## 2022-01-19 PROCEDURE — 99024 POSTOP FOLLOW-UP VISIT: CPT | Performed by: ORTHOPAEDIC SURGERY

## 2022-01-19 PROCEDURE — 97116 GAIT TRAINING THERAPY: CPT

## 2022-01-19 RX ORDER — OXYCODONE HYDROCHLORIDE AND ACETAMINOPHEN 5; 325 MG/1; MG/1
1 TABLET ORAL EVERY 6 HOURS PRN
Qty: 28 TABLET | Refills: 0 | Status: SHIPPED | OUTPATIENT
Start: 2022-01-19 | End: 2022-02-08 | Stop reason: SDUPTHER

## 2022-01-19 NOTE — PROGRESS NOTES
Marck Romero returns today he is only 8 days postop from a left total knee. Patient basically just here today to ask questions he has some concerns about some of the bruising in his leg but the patient states that he is already walked a mile and his therapist says he is doing superbly    Examination notes his a Aquacel dressing is intact he does have a little bit of bloody drainage but nothing out of the ordinary. He does have some significant bruising in the area of the tourniquet a little bit down the back of his calf he has nontender over the calf and negative Homans motion is 0 to about 100 degrees already. Impression  Status post left total knee    Plan  I told the patient that all is normal he did get a new Percocet prescription he is to resume his routine follow-up on January 26 at which time we will remove his Aquacel and get postoperative x-rays. Continue physical therapy in the meantime.

## 2022-01-19 NOTE — FLOWSHEET NOTE
509 [facility]   [address] [place] [address] [address]   Phone: [phone]   Fax: [phone]    Physical Therapy Daily Treatment Note      Date:  2022  Patient Name:  Kelly Russell    :  1955  MRN: [id number]  Physician: Gio Blanco MD                           Insurance: [employer]unt Medicare ([address] [place] [address])  Medical Diagnosis: M17.12 (ICD-10-CM) - Primary osteoarthritis of left knee         Rehab Codes:Z96.652 ,R26.2  Onset date:22            Next Dr's appt. : 22  Visit# / total visits: 3/10  Cancels/No Shows: 0/0    Subjective:    Pain:  [x] Yes  [] No Location: L knee Pain Rating: (0-10 scale) 5-6/10 with medication, up to 7-8/10 without medication  Pain altered Tx:  [] No  [] Yes  Action:  Comments: Patient reports today with several questions regarding pain and overall discomfort. Expressed some concern over amount of pain that he is experiencing when medication wears off. Reported that despite increased pain with meds wearing off that he is trying to wean off percocet. Patient also noted that he was able to walk a mile and a half yesterday. Patient also reported that he moved appt with Dr. Mumtaz Hickey to later today secondary to concern for blood clot. Objective:  Modalities:   Precautions:  Exercises:  Exercise Reps/ Time Weight/ Level Comments   Supine         Quad sets   20x 5\" hold       SAQ         Heel slides*  10x 3\" hold       SLR 20x       SL hip abduction         Prone hip extension          Seated         LAQ  20x, 3\" hold                           Standing         TKE   20x 3\"  Blue     Step ups: Forward/Lateral 20x 6\"     Heel taps (eccentric quad Left)   20x 4\"  Min UE support   Total gym squats   20x L 10 Encourage max tolerable depth    Mini Squats 20x         Other: Gait: SPC x 8 min    PROM L knee: Flexion 120, extension 0    AROM L knee: Flexion 110    Specific Instructions for next treatment:      Assessment: [x] Progressing toward goals.   Again assessed patient for any red flag symptoms for DVT with none reported with assessment. Patient able to achieve full knee extension following heel slides this date. Initiated gait training with SPC to progress pt to less restrictive AD. Patient able to consistently ambulate with good overall quality following cues for proper sequencing. Advised patient to discontinue use of walker as soon as pt is comfortable and use SPC. Added Total Gym squats to encourage deeper squat to work on ROM and maximize strengthening potential with motion. Slight increased pain/soreness reported following exercises today. [] No change. [] Other:    [x] Patient would continue to benefit from skilled physical therapy services in order to: improve L knee ROM, LE strength, and restore function as patient scored a  53% disability score on KOOS JR. Knee survey. LTG: (to be met in 10 treatments)  1. ? Pain: to 2/10 at most to ease completion with ADLs. 2. ? ROM: of L knee to 0-120 degrees to facilitate normal gait and negotiate steps. 3. ? Strength: of L LE to 5/5 to assist in completions of functional tasks without compensation. 4. ? Function: Improve KOOS JR score to <15% disability. 5. Patient to improve functional LE strength with completion 4\" heel tap with good quad control and no evidence of genu valgum. 6. Independent with Home Exercise Programs                   Patient goals: To get as normal as possible to maintain/return to his active lifestyle. Pt. Education:  [] Yes  [] No  [] Reviewed Prior HEP/Ed  Method of Education: [] Verbal  [] Demo  [] Written  Comprehension of Education:  [] Verbalizes understanding. [] Demonstrates understanding. [] Needs review. [] Demonstrates/verbalizes HEP/Ed previously given. Plan: [] Continue per plan of care.    [] Other:      Treatment Charges: Mins Units   []  Modalities     [x]  Ther Exercise 45 3   []  Manual Therapy     []  Ther Activities     [] Aquatics     []  Neuromuscular     [] Vasocompression     [x] Gait Training 8 1   [] Dry needling        [] 1 or 2 muscles        [] 3 or more muscles     []  Other     Total Treatment time 53 4     Time In: 10:18 am           Time Out: 11:11     Electronically signed by:  Yosef Sow PTA

## 2022-01-25 ENCOUNTER — HOSPITAL ENCOUNTER (OUTPATIENT)
Dept: PHYSICAL THERAPY | Age: 67
Setting detail: THERAPIES SERIES
Discharge: HOME | End: 2022-01-25
Payer: MEDICARE

## 2022-01-25 PROCEDURE — 97110 THERAPEUTIC EXERCISES: CPT

## 2022-01-25 PROCEDURE — 97116 GAIT TRAINING THERAPY: CPT

## 2022-01-25 NOTE — FLOWSHEET NOTE
509 [facility]   [facility]   Phone: [phone]   Fax: [phone]    Physical Therapy Daily Treatment Note      Date:  2022  Patient Name:  Mercedez Odom    :  1955  MRN: [id number]  Physician: Lars Gamboa MD                           Insurance: [facility] Medicare ([address] [place] [address])  Medical Diagnosis: M17.12 (ICD-10-CM) - Primary osteoarthritis of left knee         Rehab Codes:Z96.652 ,R26.2  Onset date:22            Next 's appt. : 22  Visit# / total visits: 3/10  Cancels/No Shows: 0/0    Subjective:    Pain:  [x] Yes  [] No Location: L knee Pain Rating: (0-10 scale) 5-6/10 with medication, up to 7-8/10 without medication  Pain altered Tx:  [] No  [] Yes  Action:  Comments: Patient reports today with continued concern with pain levels. Reported that weaning off Percocet has been a struggle and he is still relying on taking 3-4 times a day for pain management. Objective:  Modalities:   Precautions:  Exercises:  Exercise Reps/ Time Weight/ Level Comments   Airdyne 5 min Seat 4          Supine         Quad sets   20x 5\" hold       SAQ         Heel slides*        SLR 20x       SL hip abduction         Prone hip extension          Seated         LAQ  20x, 3\" hold                           Standing         TKE   20x 3\"  Blue     Step ups: Forward/Lateral 20x 6\"     Heel taps (eccentric quad Left)     Min UE support   Total gym squats   20x L 10 Encourage max tolerable depth   Step HS stretch 3x30\"      Mini Squats 20x         Other: Gait: SPC x 8 min      Specific Instructions for next treatment:      Assessment: [x] Progressing toward goals. Patient with notable flexed L knee ambulating into clinic today. Notably more guarded with quad sets, but able to achieve full extension with quad sets. Poor carry over in maintaining full knee extension with SLR. Added step HS stretch to promote full knee extension.  Notable improvement in knee extension at heel strike LLE following HS stretch and TKE. Instructed pt to focus more on TKE at home, using HS stretching, TKE's and quad sets. [] No change. [] Other:    [x] Patient would continue to benefit from skilled physical therapy services in order to: improve L knee ROM, LE strength, and restore function as patient scored a  53% disability score on KOOS JR. Knee survey. LTG: (to be met in 10 treatments)  1. ? Pain: to 2/10 at most to ease completion with ADLs. 2. ? ROM: of L knee to 0-120 degrees to facilitate normal gait and negotiate steps. 3. ? Strength: of L LE to 5/5 to assist in completions of functional tasks without compensation. 4. ? Function: Improve KOOS JR score to <15% disability. 5. Patient to improve functional LE strength with completion 4\" heel tap with good quad control and no evidence of genu valgum. 6. Independent with Home Exercise Programs                   Patient goals: To get as normal as possible to maintain/return to his active lifestyle. Pt. Education:  [] Yes  [] No  [] Reviewed Prior HEP/Ed  Method of Education: [] Verbal  [] Demo  [] Written  Comprehension of Education:  [] Verbalizes understanding. [] Demonstrates understanding. [] Needs review. [] Demonstrates/verbalizes HEP/Ed previously given. Plan: [] Continue per plan of care.    [] Other:      Treatment Charges: Mins Units   []  Modalities     [x]  Ther Exercise 36 2   []  Manual Therapy     []  Ther Activities     []  Aquatics     []  Neuromuscular     [] Vasocompression     [x] Gait Training 8 1   [] Dry needling        [] 1 or 2 muscles        [] 3 or more muscles     []  Other     Total Treatment time 44 3     Time In: 9:31 am           Time Out: 10:15 am    Electronically signed by:  Christiano Pizarro PTA

## 2022-01-26 ENCOUNTER — OFFICE VISIT (OUTPATIENT)
Dept: ORTHOPEDIC SURGERY | Age: 67
End: 2022-01-26

## 2022-01-26 DIAGNOSIS — Z96.652 STATUS POST TOTAL LEFT KNEE REPLACEMENT: Primary | ICD-10-CM

## 2022-01-26 PROCEDURE — 99024 POSTOP FOLLOW-UP VISIT: CPT | Performed by: ORTHOPAEDIC SURGERY

## 2022-01-26 NOTE — PROGRESS NOTES
Akhil Salter M.D.            [address] [facility] [address] [facility],[address], [address] [facility]           Dept Phone: [phone]           Dept Fax:  [phone], [place]          Dept Phone: [phone]           Dept Fax:  [phone]      Chief Compliant:  Chief Complaint   Patient presents with    Post-Op Check     LT TKA 1/11/22        History of Present Illness:  Patient returns today status post left TKA times 2 weeks. Patient has no major complaints     Review of Systems   Constitutional: Negative for fever, chills, sweats, recent injury, recent illness  Neurological: Negative for Headaches, numbness, or weakness. Integumentary: Negative for rash, itching, ecchymosis, or wounds. Musculoskeletal: Positive for Post-Op Check (LT TKA 1/11/22)       Physical Exam:  Constitutional: Patient is oriented to person, place, and time. Patient appears well-developed and well nourished. Musculoskeletal: Normal gait. Motion 0-100 degrees with expected pain with ROM. No Calf tenderness, Negative Lazaro's sign. Neurovascular intact. Neurological: Patient is alert and oriented to person, place, and time. Normal strenght. No sensory deficit. Skin: Skin is warm and dry. Incision is healing well without signs of redness or drainage  Nursing note and vitals reviewed. Labs and Imaging:     XR taken today:  XR KNEE LEFT (1-2 VIEWS)    Result Date: 1/26/2022  X-rays taken today reviewed by me shows AP lateral views the patient's left knee. Patient status post left total knee arthroplasty components are in good alignment in both AP and lateral views         Orders Placed This Encounter   Procedures    XR KNEE LEFT (1-2 VIEWS)     Standing Status:   Future     Number of Occurrences:   1     Standing Expiration Date:   1/27/2022       Assessment and Plan:  1. Status post total left knee replacement        2 weeks status post left TKA        This is a 77 y.o. male who presents to the clinic today status post left TKA. Continue anticoagulation. Transition to outpatient Pt. Restrictions given. RTO 5-6 weeks. Call if any problems/issues prior to that       Provider Attestation:  Jana Harkins, personally performed the services described in this documentation. All medical record entries made by the scribe were at my direction and in my presence. I have reviewed the chart and discharge instructions and agree that the records reflect my personal performance and is accurate and complete. Dock Gitelman, MD. 01/26/22        Please note that this chart was generated using voice recognition Dragon dictation software. Although every effort was made to ensure the accuracy of this automated transcription, some errors in transcription may have occurred.

## 2022-01-27 ENCOUNTER — HOSPITAL ENCOUNTER (OUTPATIENT)
Dept: PHYSICAL THERAPY | Age: 67
Setting detail: THERAPIES SERIES
Discharge: HOME | End: 2022-01-27
Payer: MEDICARE

## 2022-01-27 PROCEDURE — 97110 THERAPEUTIC EXERCISES: CPT

## 2022-01-27 NOTE — FLOWSHEET NOTE
509 [facility]   [facility]   Phone: [phone]   Fax: [phone]    Physical Therapy Daily Treatment Note      Date:  2022  Patient Name:  Wu Green    :  1955  MRN: [id number]  Physician: Sorin Guerra MD                           Insurance: [employer] Medicare ([address] [place] [address])  Medical Diagnosis: M17.12 (ICD-10-CM) - Primary osteoarthritis of left knee         Rehab Codes:Z96.652 ,R26.2  Onset date:22            Next Dr's appt. : 22  Visit# / total visits: 5/10  Cancels/No Shows: 0/0    Subjective:    Pain:  [x] Yes  [] No Location: L knee Pain Rating: (0-10 scale) n/a/10  Pain altered Tx:  [] No  [] Yes  Action:  Comments: Patient reports today that L knee continues to be most stiff in the morning. Reported that he feels pain is becoming better managed and he is now taking less pain meds. Patient did not provide pain rating when aksed. Patient had 2 week follow up with Dr. Cherry Dominguez yesterday. Per pt,  is very happy with progress at this point. Objective:  Modalities:   Precautions:  Exercises:  Exercise Reps/ Time Weight/ Level Comments   Airdyne 5 min Seat 4          Supine         Quad sets   20x 5\" hold       SAQ         Heel slides*        SLR        SL hip abduction         Prone hip extension          Seated         LAQ  20x, 3\" hold                           Standing         TKE   20x 3\"  Blue     Step ups: Forward/Lateral 20x 8\"     Heel taps (eccentric quad Left)     Min UE support   Total gym squats   20x L 10 Encourage max tolerable depth   Step HS stretch 3x30\"      Mini Squats 20x       Lunges 10x2       Other: Gait:       Specific Instructions for next treatment:      Assessment: [x] Progressing toward goals. Continued to focus on improving LLE strength and ROM. Added lunges for additional LLE strengthening.  Patient able to achieve full active knee extension with quad sets, but unable to maintain for longer periods secondary to HS tightness and discomfort in position. [] No change. [] Other:    [x] Patient would continue to benefit from skilled physical therapy services in order to: improve L knee ROM, LE strength, and restore function as patient scored a  53% disability score on KOOS JR. Knee survey. LTG: (to be met in 10 treatments)  1. ? Pain: to 2/10 at most to ease completion with ADLs. 2. ? ROM: of L knee to 0-120 degrees to facilitate normal gait and negotiate steps. 3. ? Strength: of L LE to 5/5 to assist in completions of functional tasks without compensation. 4. ? Function: Improve KOOS JR score to <15% disability. 5. Patient to improve functional LE strength with completion 4\" heel tap with good quad control and no evidence of genu valgum. 6. Independent with Home Exercise Programs                   Patient goals: To get as normal as possible to maintain/return to his active lifestyle. Pt. Education:  [] Yes  [] No  [] Reviewed Prior HEP/Ed  Method of Education: [] Verbal  [] Demo  [] Written  Comprehension of Education:  [] Verbalizes understanding. [] Demonstrates understanding. [] Needs review. [] Demonstrates/verbalizes HEP/Ed previously given. Plan: [] Continue per plan of care.    [] Other:      Treatment Charges: Mins Units   []  Modalities     [x]  Ther Exercise 40 3   []  Manual Therapy     []  Ther Activities     []  Aquatics     []  Neuromuscular     [] Vasocompression     [] Gait Training     [] Dry needling        [] 1 or 2 muscles        [] 3 or more muscles     []  Other     Total Treatment time 40 3     Time In: 10:00 am             Time Out: 10:40 am    Electronically signed by:  Bruce Casillas PTA

## 2022-02-01 ENCOUNTER — HOSPITAL ENCOUNTER (OUTPATIENT)
Dept: PHYSICAL THERAPY | Age: 67
Setting detail: THERAPIES SERIES
Discharge: HOME | End: 2022-02-01
Payer: MEDICARE

## 2022-02-01 PROCEDURE — 97110 THERAPEUTIC EXERCISES: CPT

## 2022-02-01 NOTE — FLOWSHEET NOTE
509 [facility]   [address]   Phone: [phone]   Fax: [phone]    Physical Therapy Daily Treatment Note      Date:  2022  Patient Name:  Sharon Parker    :  1955  MRN: [id number]  Physician: Briseida De La Cruz MD                           Insurance: [employer]ann Fate Medicare ([address] [place] [address])  Medical Diagnosis: M17.12 (ICD-10-CM) - Primary osteoarthritis of left knee         Rehab Codes:Z96.652 ,R26.2  Onset date:22            Next 's appt. : 22  Visit# / total visits: 6/10  Cancels/No Shows: 0/0    Subjective:    Pain:  [] Yes  [x] No Location: L knee Pain Rating: (0-10 scale) 0/10  Pain altered Tx:  [] No  [] Yes  Action:  Comments: Patient reports today feeling well overall. Reported that his biggest issue is stiffness in L knee with prolonged sitting. Patient questioned discontinuing use of SPC for ambulation. Objective:  Modalities:   Precautions:  Exercises:  Exercise Reps/ Time Weight/ Level Comments   Airdyne 5 min Seat 4          Supine         Quad sets   20x 5\" hold       SAQ         Heel slides*        SLR        SL hip abduction         Prone hip extension          Seated         LAQ  20x, 3\" hold    Assistance at end range to promote full knee extension                        Standing         TKE   20x 3\"  Blue     Step ups: Forward/Lateral 20x 8\"     Heel taps (eccentric quad Left)  10X2 6\"  Min UE support   Total gym squats   20x L 10 Encourage max tolerable depth   Step HS stretch 3x30\"      Mini Squats 20x       Lunges 10x2   Forward and lateral ea LE     Other: Gait:       Specific Instructions for next treatment:      Assessment: [x] Progressing toward goals. Continued to focus on improving LLE strength and ROM. Patient able to achieve TKE, but demonstrated difficulty maintaining in OKC. Added assistance with LAQ to promote full knee extension in open chain with less resistance with continued reps.  Added lateral lunges and forward lunges with RLE leading to further challenge neuromuscular control of LLE. Patient demonstrated good overall stability in LLE with increased demand although more challenging for pt to maintain. [] No change. [] Other:    [x] Patient would continue to benefit from skilled physical therapy services in order to: improve L knee ROM, LE strength, and restore function as patient scored a  53% disability score on KOOS JR. Knee survey. LTG: (to be met in 10 treatments)  1. ? Pain: to 2/10 at most to ease completion with ADLs. 2. ? ROM: of L knee to 0-120 degrees to facilitate normal gait and negotiate steps. 3. ? Strength: of L LE to 5/5 to assist in completions of functional tasks without compensation. 4. ? Function: Improve KOOS JR score to <15% disability. 5. Patient to improve functional LE strength with completion 4\" heel tap with good quad control and no evidence of genu valgum. 6. Independent with Home Exercise Programs                   Patient goals: To get as normal as possible to maintain/return to his active lifestyle. Pt. Education:  [] Yes  [] No  [] Reviewed Prior HEP/Ed  Method of Education: [] Verbal  [] Demo  [] Written  Comprehension of Education:  [] Verbalizes understanding. [] Demonstrates understanding. [] Needs review. [] Demonstrates/verbalizes HEP/Ed previously given. Plan: [] Continue per plan of care.    [] Other:      Treatment Charges: Mins Units   []  Modalities     [x]  Ther Exercise 42 3   []  Manual Therapy     []  Ther Activities     []  Aquatics     []  Neuromuscular     [] Vasocompression     [] Gait Training     [] Dry needling        [] 1 or 2 muscles        [] 3 or more muscles     []  Other     Total Treatment time 42 3     Time In: 9:28 am             Time Out: 10:10 am    Electronically signed by:  Kayla Roberts PTA

## 2022-02-02 NOTE — FLOWSHEET NOTE
509 [facility]              [facility]              Phone: [phone]              Fax: [phone]     Physical Therapy Daily Treatment Note        Date:  2022  Patient Name:  Richardson Shabazz                   :  1955                     MRN: [id number]  Tyree Elias MD                           Insurance: [facility] Medicare ([facility])  Medical Diagnosis: M17.12 (ICD-10-CM) - Primary osteoarthritis of left knee         Rehab Codes:Z96.652 ,R26.2  Onset date:22            Next 's appt. : 22  Visit# / total visits: 6/10                    Cancels/No Shows: 0/0     Subjective:    Pain:  []? Yes  [x]? No   Location: L knee         Pain Rating: (0-10 scale) 0/10  Pain altered Tx:  []? No  []? Yes  Action:  Comments: Patient reports today feeling well overall. Reported that his biggest issue is stiffness in L knee with prolonged sitting. Patient questioned discontinuing use of SPC for ambulation.          Objective:  Modalities:   Precautions:  Exercises:  Exercise Reps/ Time Weight/ Level Comments   Airdyne 5 min Seat 4               Supine         Quad sets   20x 5\" hold       SAQ         Heel slides*         SLR         SL hip abduction         Prone hip extension          Seated         LAQ  20x, 3\" hold    Assistance at end range to promote full knee extension                        Standing         TKE   20x 3\"  Blue     Step ups: Forward/Lateral 20x 8\"     Heel taps (eccentric quad Left)  10X2 6\"  Min UE support   Total gym squats   20x L 10 Encourage max tolerable depth   Step HS stretch 3x30\"        Mini Squats 20x       Lunges 10x2    Forward and lateral ea LE      Other: Gait:         Specific Instructions for next treatment:        Assessment:  [x]? Progressing toward goals. Continued to focus on improving LLE strength and ROM. Patient able to achieve TKE, but demonstrated difficulty maintaining in OKC.  Added assistance with LAQ to promote full knee extension in open chain with less resistance with continued reps. Added lateral lunges and forward lunges with RLE leading to further challenge neuromuscular control of LLE. Patient demonstrated good overall stability in LLE with increased demand although more challenging for pt to maintain. []? No change. []? Other:                          [x]? Patient would continue to benefit from skilled physical therapy services in order to: improve L knee ROM, LE strength, and restore function as patient scored a  53% disability score on KOOS JR. Knee survey.     LTG: (to be met in 10 treatments)  1. ? Pain: to 2/10 at most to ease completion with ADLs.   2. ? ROM: of L knee to 0-120 degrees to facilitate normal gait and negotiate steps.    3. ? Strength: of L LE to 5/5 to assist in completions of functional tasks without compensation.   4. ? Function: Improve KOOS JR score to <15% disability. 5. Patient to improve functional LE strength with completion 4\" heel tap with good quad control and no evidence of genu valgum.   6. Independent with Home Exercise Programs                   Patient goals: To get as normal as possible to maintain/return to his active lifestyle.      Pt. Education:  []? Yes  []? No  []? Reviewed Prior HEP/Ed  Method of Education: []? Verbal  []? Demo  []? Written  Comprehension of Education:  []? Verbalizes understanding. []? Demonstrates understanding. []? Needs review. []? Demonstrates/verbalizes HEP/Ed previously given.              Plan:    []? Continue per plan of care. []? Other:                            Treatment Charges: Mins Units   []? Modalities       [x]? Ther Exercise 42 3   []? Manual Therapy       []? Ther Activities       []? Aquatics       []? Neuromuscular       []? Vasocompression       []? Gait Training       []? Dry needling        []?  1 or 2 muscles []? 3 or more muscles       []?   Other       Total Treatment time 42 3      Time In: 9:28 am             Time Out: 10:10 am     Electronically signed by:  Franki Mahmood PTA

## 2022-02-03 ENCOUNTER — HOSPITAL ENCOUNTER (OUTPATIENT)
Dept: PHYSICAL THERAPY | Age: 67
Setting detail: THERAPIES SERIES
End: 2022-02-03
Payer: MEDICARE

## 2022-02-03 ENCOUNTER — HOSPITAL ENCOUNTER (OUTPATIENT)
Dept: PHYSICAL THERAPY | Age: 67
Setting detail: THERAPIES SERIES
Discharge: HOME | End: 2022-02-03
Payer: MEDICARE

## 2022-02-07 DIAGNOSIS — Z96.652 STATUS POST TOTAL LEFT KNEE REPLACEMENT: ICD-10-CM

## 2022-02-07 NOTE — TELEPHONE ENCOUNTER
Pt called in requesting refill on oxyCODONE-acetaminophen (PERCOCET) 5-325 MG per tablet  Pt also states he is completely out of meds

## 2022-02-08 ENCOUNTER — HOSPITAL ENCOUNTER (OUTPATIENT)
Dept: PHYSICAL THERAPY | Age: 67
Setting detail: THERAPIES SERIES
Discharge: HOME | End: 2022-02-08
Payer: MEDICARE

## 2022-02-08 PROCEDURE — 97110 THERAPEUTIC EXERCISES: CPT

## 2022-02-08 PROCEDURE — 97016 VASOPNEUMATIC DEVICE THERAPY: CPT

## 2022-02-08 RX ORDER — OXYCODONE HYDROCHLORIDE AND ACETAMINOPHEN 5; 325 MG/1; MG/1
TABLET ORAL
Qty: 28 TABLET | Refills: 0 | Status: SHIPPED | OUTPATIENT
Start: 2022-02-08 | End: 2022-02-08 | Stop reason: SDUPTHER

## 2022-02-08 RX ORDER — OXYCODONE HYDROCHLORIDE AND ACETAMINOPHEN 5; 325 MG/1; MG/1
1 TABLET ORAL EVERY 6 HOURS PRN
Qty: 28 TABLET | Refills: 0 | Status: SHIPPED | OUTPATIENT
Start: 2022-02-08 | End: 2022-02-15

## 2022-02-08 NOTE — FLOWSHEET NOTE
509 [facility]              [address] [facility] [address]              Phone: [phone]              Fax: [phone]     Physical Therapy Daily Treatment Note        Date:  2022  Patient Name:  Fabián Braden                   :  1955                     MRN: [id number]  [address], [place]                           Insurance: [facility] access hospital Medicare ([facility])  Medical Diagnosis: M17.12 (ICD-10-CM) - Primary osteoarthritis of left knee         Rehab Codes:Z96.652 ,R26.2  Onset date:22            Next Dr's appt. : 22  Visit# / total visits: 7/10                    Cancels/No Shows: 0/0     Subjective:    Pain:  []? Yes  [x]? No   Location: L knee         Pain Rating: (0-10 scale) 3/10  Pain altered Tx:  []? No  []? Yes  Action:  Comments: Patient reports his stiffness is getting better but he has discomfort at night that makes it hard for him to fall asleep. Patient reports he is sleeping with his L leg elevated with pillows under the leg. Objective:  Modalities: added VASOCOMPRESSION 34 degrees x 15' low compression post ex to decrease edema and pain. Vaso -Added 22   Precautions:  Exercises:  Exercise Reps/ Time Weight/ Level Comments   Airdyne 5 min Seat 4               Supine         Quad sets   20x 5\" hold       HS stretch with strap   Added to [facility] 22    SAQ         Heel slides*  10 x 5\" hold  with strap      SLR         SL hip abduction         Prone hip extension          Seated         LAQ  20x, 3\" hold    Added to HEP 22                       Standing         Squat to chair touch    Added to HEP 22   Ascend/descend flight of stairs reciprocal  x1 each  Added 22   TKE   20x 3\"  green  added to HEP 22   Step ups: Forward/Lateral      Heel taps (eccentric quad Left)       Heel taps eccentric quad left Lateral  X 15 4\"  Progress to 6\" next session.     Step down R, L on step x15 6\" Added 22   Total gym squats   20x L 10 Encourage max tolerable depth   Step HS stretch 3x30\"    added to HEP 2/8/22    Mini Squats 20x       Lunges 10x   Forward and lateral ea LE- added to [facility] 2/8/22       Other: Left knee AROM: -3-110 degrees - following heel slides x 10 for flexion and following quad sets x10 for extension measurement. KOOS JR. Score 42% impairment (11% improvement from evaluation).       Specific Instructions for next treatment: focus on knee EXTENSION, functional strengthening, ascend/descending steps.         Assessment:  [x]? Progressing toward goals. Patient requires review on precautions of keeping L knee straight and to not proper pillows under the knee as this is going to interfere with achieving TKE. Patient is able to achieve TKE but functionally cannot maintain it, ie. with TKE against resistance and during left stance phase of gait. Patient hesitates with descending steps and has to complete slowing with reciprocal gait pattern as he has difficulty with R LE descent. Patient reports anterior knee pain following resisted TKE but when completing it just actively follow this exercise the pain is absent. Patient cannot compete squat to 19\" chair due to lack of L knee mobility but is able to complete with pillow added to the chair. Added Vasocompression post exercise for edema and pain control. Patient will benefit from continued therapy to focus on left TKE and progress functional strengthening of quadriceps. []? No change. []? Other:                               LTG: (to be met in 10 treatments)  1. ? Pain: to 2/10 at most to ease completion with ADLs.   2. ? ROM: of L knee to 0-120 degrees to facilitate normal gait and negotiate steps.    3. ? Strength: of L LE to 5/5 to assist in completions of functional tasks without compensation.   4. ? Function: Improve KOOS JR score to <15% disability.    5. Patient to improve functional LE strength with completion 4\" heel tap with good quad control and no evidence of genu valgum.  MET  6. Independent with Home Exercise Programs                      Patient goals: To get as normal as possible to maintain/return to his active lifestyle.      Pt. Education:  [x]? Yes  []? No  []? Reviewed Prior HEP/Ed  Method of Education: [x]? Verbal  [x]? Demo  [x]? Written  Progressed HEP as above. Patient educated on keeping L knee straight at night and to NOT proper it up with pillows as this can hinder achieving TKE. Comprehension of Education:  [x]? Verbalizes understanding. [x]? Demonstrates understanding. [x]? Needs review. (knee extension education)   []? Demonstrates/verbalizes HEP/Ed previously given.              Plan:    [x]? Continue per plan of care. []? Other:                            Treatment Charges: Mins Units   []? Modalities       [x]? Ther Exercise 45 3   []? Manual Therapy       []? Ther Activities       []? Aquatics       []? Neuromuscular       [x]? Vasocompression  15  1   []? Gait Training       []? Dry needling        []? 1 or 2 muscles        []? 3 or more muscles       []?   Other       Total Treatment time 60 4      Time In: 9:55 am             Time Out: 10:55 am     Electronically signed by:  Jayshree Ferguson PTA

## 2022-02-08 NOTE — TELEPHONE ENCOUNTER
oxyCODONE-acetaminophen (PERCOCET) 5-325 MG per tablet 28 tablet 0 2/8/2022 2/15/2022    Sig - Route: Take 1 tablet by mouth every 6 hours as needed for Pain for up to 7 days. Intended supply: 7 days.  Take lowest dose possible to manage pain - Oral    Sent to pharmacy as: oxyCODONE-Acetaminophen 5-325 MG Oral Tablet (Percocet)      Date of Procedure: 1/11/2022  Pre-Op Diagnosis: DJD LEFT KNEE   Procedure(s):  KNEE TOTAL ARTHROPLASTY

## 2022-02-10 ENCOUNTER — HOSPITAL ENCOUNTER (OUTPATIENT)
Dept: PHYSICAL THERAPY | Age: 67
Setting detail: THERAPIES SERIES
Discharge: HOME | End: 2022-02-10
Payer: MEDICARE

## 2022-02-10 PROCEDURE — 97110 THERAPEUTIC EXERCISES: CPT

## 2022-02-10 NOTE — FLOWSHEET NOTE
509 [facility]              [address]              Phone: [phone]              Fax: [phone]     Physical Therapy Daily Treatment Note        Date:  2022  Patient Name:  Adis Graham                   :  1955                     MRN: [id number]  Tyree Elias MD                           Insurance: [employer] Medicare ([facility])  Medical Diagnosis: M17.12 (ICD-10-CM) - Primary osteoarthritis of left knee         Rehab Codes:Z96.652 ,R26.2  Onset date:22            Next 's appt. : 22  Visit# / total visits: 7/10                    Cancels/No Shows: 0/0     Subjective:    Pain:  []? Yes  [x]? No   Location: L knee         Pain Rating: (0-10 scale) 3/10  Pain altered Tx:  []? No  []? Yes  Action:  Comments: Patient continues to reports pain at night and difficulty sleeping. Patient admits he is probably propping his L leg more than he realized to get comfortable at night. He plans to be more aware of this. Patient reports he felt no difference with vasocompression last session. Objective:  Modalities:   Precautions:  Exercises:  Exercise Reps/ Time Weight/ Level Comments   Mayank Watkins  8'    focus on TKE on L LE with 2-3\" each time    Supine         Quad sets          HS stretch with strap   Added to [facility] 22    SAQ         Heel slides*          SLR  2x15  2#AW   progressed 2/10/22    SL hip abduction         Prone hip extension          Seated         LAQ     Added to [facility] 22                       Standing         Squat to chair touch  2x10  Added to HEP 22   Supine TKE with weight 2x1' 7.5 # Towel under ankle- added 2/10/22    TKE   20x 5\"  green  added to [facility] 22   Step ups: Forward x15      Heel taps (eccentric quad Left)       Heel taps eccentric quad left Lateral  2X 15 4\"  Progress to 6\" next session.     Step down R, L on step x15 6\" Added 22   Total gym squats    Encourage max tolerable depth   Step HS stretch 3x20\"    added to HEP 2/8/22    Mini Squats        Lunges    Forward and lateral ea LE- added to HEP 2/8/22        Other:         Specific Instructions for next treatment: focus on knee EXTENSION, functional strengthening, ascend/descending steps.         Assessment:  [x]? Progressing toward goals. Patient exhibits improved TKE this date since discussion last visit about not using pillow under knee. I reinforced with patient the importance of keeping his L knee straight. Patient arrives ambulating with lack of extension in stance phase that is improved by end of session. Patient reports 8/10 pain with TKE against resistance but says he is able to complete the exercise. Added weighted SLR and supine TKE with heavy resistance to reinforce TKE. Patients L knee Extension post exercise was -1 degrees. 4 degree improvement from last visit. Patient will benefit from continued work on TKE to normalize gait. []? No change. []? Other:                               LTG: (to be met in 10 treatments)  1. ? Pain: to 2/10 at most to ease completion with ADLs.   2. ? ROM: of L knee to 0-120 degrees to facilitate normal gait and negotiate steps.    3. ? Strength: of L LE to 5/5 to assist in completions of functional tasks without compensation.   4. ? Function: Improve KOOS JR score to <15% disability. 5. Patient to improve functional LE strength with completion 4\" heel tap with good quad control and no evidence of genu valgum.  MET  6. Independent with Home Exercise Programs                      Patient goals: To get as normal as possible to maintain/return to his active lifestyle.      Pt. Education:  [x]? Yes  []? No  []? Reviewed Prior HEP/Ed  Method of Education: [x]? Verbal  [x]? Demo  [x]? Written  Progressed HEP as above.  Patient educated on keeping L knee straight at night and to NOT proper it up with pillows as this can hinder achieving TKE. Comprehension of Education:  [x]? Verbalizes understanding. [x]? Demonstrates understanding. [x]? Needs review. (knee extension education)   []? Demonstrates/verbalizes HEP/Ed previously given.              Plan:    [x]? Continue per plan of care. []? Other:                            Treatment Charges: Mins Units   []? Modalities       [x]? Ther Exercise 40 3   []? Manual Therapy       []? Ther Activities       []? Aquatics       []? Neuromuscular       [x]? Vasocompression     []? Gait Training       []? Dry needling        []? 1 or 2 muscles        []? 3 or more muscles       []?   Other       Total Treatment time 40 3      Time In: 10:05am am             Time Out: 10:45 am     Electronically signed by:  Era Padgett PTA

## 2022-02-15 ENCOUNTER — HOSPITAL ENCOUNTER (OUTPATIENT)
Dept: PHYSICAL THERAPY | Age: 67
Setting detail: THERAPIES SERIES
Discharge: HOME | End: 2022-02-15
Payer: MEDICARE

## 2022-02-15 PROCEDURE — 97110 THERAPEUTIC EXERCISES: CPT

## 2022-02-15 NOTE — FLOWSHEET NOTE
added to HEP 2/8/22        Other: Manual: Grade III/IV Femoral-tibial mobs to promote knee extension x 2 min pt supine        Specific Instructions for next treatment: focus on knee EXTENSION, functional strengthening, ascend/descending steps.         Assessment:  [x]? Progressing toward goals. Continued heave focus on consistently obtaining TKE. Performed LAQ this date, using assistance for last 2-3 degrees of ROM to further promote TKE in [place] required for proper gait. Mobs performed in between sets of LAQ with patient requiring less assistance to obtain full extension with second set. Patient demonstrated improved TKE with LLE heel strike leaving gym at end of session. []? No change. []? Other:                               LTG: (to be met in 10 treatments)  1. ? Pain: to 2/10 at most to ease completion with ADLs.   2. ? ROM: of L knee to 0-120 degrees to facilitate normal gait and negotiate steps.    3. ? Strength: of L LE to 5/5 to assist in completions of functional tasks without compensation.   4. ? Function: Improve KOOS JR score to <15% disability. 5. Patient to improve functional LE strength with completion 4\" heel tap with good quad control and no evidence of genu valgum.  MET  6. Independent with Home Exercise Programs                      Patient goals: To get as normal as possible to maintain/return to his active lifestyle.      Pt. Education:  [x]? Yes  []? No  []? Reviewed Prior HEP/Ed  Method of Education: [x]? Verbal  [x]? Demo  [x]? Written  Progressed HEP as above. Patient educated on keeping L knee straight at night and to NOT proper it up with pillows as this can hinder achieving TKE. Comprehension of Education:  [x]? Verbalizes understanding. [x]? Demonstrates understanding. [x]? Needs review. (knee extension education)   []? Demonstrates/verbalizes HEP/Ed previously given.              Plan:    [x]? Continue per plan of care. []? Other:                            Treatment Charges: Mins Units   []? Modalities       [x]? Ther Exercise 40 3   [x]? Manual Therapy  2 0   []? Ther Activities       []? Aquatics       []? Neuromuscular       []? Vasocompression     []? Gait Training       []? Dry needling        []? 1 or 2 muscles        []? 3 or more muscles       []?   Other       Total Treatment time 42 3      Time In: 8:01 am            Time Out: 8:43 am     Electronically signed by:  Rupal Zambrano PTA

## 2022-02-17 ENCOUNTER — HOSPITAL ENCOUNTER (OUTPATIENT)
Dept: PHYSICAL THERAPY | Age: 67
Setting detail: THERAPIES SERIES
Discharge: HOME | End: 2022-02-17
Payer: MEDICARE

## 2022-02-17 PROCEDURE — 97110 THERAPEUTIC EXERCISES: CPT

## 2022-02-17 NOTE — FLOWSHEET NOTE
509 [facility]              [address]              Phone: [phone]              Fax: [phone]     DISCHARGE SUMMARY/ Physical Therapy Daily Treatment Note        Date:  2022  Patient Name:  Kailey Matos                   :  1955                     MRN: [id number]  Tyree Elias MD                           Insurance: [employer] Medicare ([facility])  Medical Diagnosis: M17.12 (ICD-10-CM) - Primary osteoarthritis of left knee         Rehab Codes:Z96.652 ,R26.2  Onset date:22            Next 's appt. : 22  Visit# / total visits: 9/10                    Cancels/No Shows: 0/0     Subjective:    Pain:  []? Yes  [x]? No   Location: L knee         Pain Rating: (0-10 scale) 2/10  Pain altered Tx:  []? No  []? Yes  Action:  Comments: Patient reports his pain is getting less and is he having less pain at night now. Patient reports compliance with HEP and says the lunges are getting easier to do. This is last session as patient has plans to leave for [facility] next week for a 3-4 week vacation. Objective:   Modalities:   Precautions:  Exercises:  Exercise Reps/ Time Weight/ Level Comments   Mayank 5'            Supine         Quad sets   10x3\" hold                       Heel slides* X10 5\" HOLD             OBJECTIVE MEASURES:  R knee AROM 0-110 degrees, KOOS JR. Score 35% impairment (score limited by patient unable to do pivot/twisting) 8% improvement in score since last week alone. MMT of L LE              Assessment:  [x]? Patient has achieved TKE with  AROM WFL of L knee at 0-110 degrees. Patient has met majority of functional/strength goals at this time. Remaining limitations can be addressed with HEP that has been advanced and provided to patient. He has been encouraged to complete exercises daily and to continue to walk for exercise. Patient has made significant improvement since evaluation and is ambulating without AD.    LTG: (to be met in 10 treatments)   1. ? Pain: to 2/10 at most to ease completion with ADLs. PARTIALLY MET   2. ? ROM: of L knee to 0-120 degrees to facilitate normal gait and negotiate steps.  PARTIALLY MET  3. ? Strength: of L LE to 5/5 to assist in completions of functional tasks without compensation.  MET   4. ? Function: Improve KOOS JR score to <15% disability. PARTIALLY MET   5. Patient to improve functional LE strength with completion 4\" heel tap with good quad control and no evidence of genu valgum.  MET  6. Independent with Home Exercise Programs. MET                      Patient goals: To get as normal as possible to maintain/return to his active lifestyle.      Pt. Education:  [x]? Yes  []? No  [x]? Reviewed Prior HEP/Ed  Method of Education: [x]? Verbal  [x]? Demo  []? Written    Comprehension of Education:  []? Verbalizes understanding. []? Demonstrates understanding. []? Needs review. (knee extension education)   [x]? Demonstrates/verbalizes HEP/Ed previously given.              Plan:    PATIENT DISCHARGED WITH CONTINUATION OF HEP. Treatment Charges: Mins Units   []? Modalities       [x]? Ther Exercise 30 2   []? Manual Therapy      []? Ther Activities       []? Aquatics       []? Neuromuscular       []? Vasocompression     []? Gait Training       []? Dry needling        []? 1 or 2 muscles        []? 3 or more muscles       []?   Other       Total  30 2      Time In:10:30am           Time Out: 11:00am     Electronically signed by:  Damien Ashraf PTA

## 2022-02-23 ENCOUNTER — OFFICE VISIT (OUTPATIENT)
Dept: ORTHOPEDIC SURGERY | Age: 67
End: 2022-02-23

## 2022-02-23 VITALS — WEIGHT: 170 LBS | HEIGHT: 68 IN | BODY MASS INDEX: 25.76 KG/M2

## 2022-02-23 DIAGNOSIS — Z96.652 STATUS POST TOTAL LEFT KNEE REPLACEMENT: Primary | ICD-10-CM

## 2022-02-23 PROCEDURE — 99024 POSTOP FOLLOW-UP VISIT: CPT | Performed by: ORTHOPAEDIC SURGERY

## 2022-02-23 RX ORDER — OXYCODONE HYDROCHLORIDE AND ACETAMINOPHEN 5; 325 MG/1; MG/1
1 TABLET ORAL EVERY 6 HOURS PRN
Qty: 28 TABLET | Refills: 0 | Status: SHIPPED | OUTPATIENT
Start: 2022-02-23 | End: 2022-03-02

## 2022-02-23 NOTE — PROGRESS NOTES
Patient returns today 6 and half week status post left total knee arthroplasty. Patient states overall he is doing very well. He still has numbness in the lateral aspect of his knee which is expected he just completed his physical therapy and is got a home exercise program now. Examination the patient's left knee notes his wound is pristine. Patient essentially has full extension. I can push him back to about 115 degrees when he sits in his chair and leans forward and get back to but 125. He has good stability good patellar tracking    Impression  Status post left total knee 1/11/2022    Plan  Patient is instructed I working on his flexion and showed him how to do this in a few exercises.  They are leaving down to [place] for the next several weeks we will see him back here in 2 months call with any problems prior to that time

## 2022-03-21 DIAGNOSIS — Z00.00 ROUTINE MEDICAL EXAM: ICD-10-CM

## 2022-03-21 RX ORDER — MELOXICAM 15 MG/1
15 TABLET ORAL DAILY
Qty: 90 TABLET | Refills: 0 | Status: SHIPPED | OUTPATIENT
Start: 2022-03-21 | End: 2022-04-27 | Stop reason: SDUPTHER

## 2022-04-27 ENCOUNTER — OFFICE VISIT (OUTPATIENT)
Dept: ORTHOPEDIC SURGERY | Age: 67
End: 2022-04-27
Payer: MEDICARE

## 2022-04-27 VITALS — BODY MASS INDEX: 25.76 KG/M2 | WEIGHT: 170 LBS | HEIGHT: 68 IN

## 2022-04-27 DIAGNOSIS — Z00.00 ROUTINE MEDICAL EXAM: ICD-10-CM

## 2022-04-27 DIAGNOSIS — Z96.652 STATUS POST TOTAL LEFT KNEE REPLACEMENT: Primary | ICD-10-CM

## 2022-04-27 DIAGNOSIS — M17.12 PRIMARY OSTEOARTHRITIS OF LEFT KNEE: ICD-10-CM

## 2022-04-27 PROCEDURE — 99213 OFFICE O/P EST LOW 20 MIN: CPT | Performed by: PHYSICIAN ASSISTANT

## 2022-04-27 RX ORDER — ROSUVASTATIN CALCIUM 5 MG/1
5 TABLET, COATED ORAL EVERY OTHER DAY
COMMUNITY
Start: 2022-04-20 | End: 2022-08-02

## 2022-04-27 RX ORDER — MELOXICAM 15 MG/1
15 TABLET ORAL DAILY
Qty: 90 TABLET | Refills: 1 | Status: SHIPPED | OUTPATIENT
Start: 2022-04-27

## 2022-04-27 NOTE — PROGRESS NOTES
[address] [facility], [address] [facility] [address], [address] [facility], [address] [facility]           Dept Phone: [phone]           Dept Fax:  [phone] [address]           [place]          Dept Phone: [phone]           Dept Fax:  [phone]      Chief Compliant:  Chief Complaint   Patient presents with    Knee Pain     Left        History of Present Illness:  Elizabeth Arroyo returns today. This is a 77 y.o. male who presents to the clinic today for follow up of left total knee arthroplasty performed on 1/11/22. Patient overall reports he is doing quite well he has very little pain with activity. Does note some soreness in the evenings when he is very active and occasional clicking but overall reports he is doing very well. He continues the daily home exercise as directed by PT without issues. Patient does take meloxicam on an as-needed basis for soreness which does seem to help requesting refill at this time. He denies any joint warmth, redness, fever or chills      Review of Systems   Constitutional: Negative for fever, chills, sweats, recent illness, or recent injury. Neurological: Negative for headaches, numbness, or weakness. Integumentary: Negative for rash, itching, ecchymosis, abrasions, or laceration. Musculoskeletal: Positive for Knee Pain (Left)       Physical Exam:  Constitutional: Patient is oriented to person, place, and time. Patient appears well-developed and well nourished. Musculoskeletal:    Left Knee    Gait:  Normal.   Incision:  Well healed without any incisional erythema. Tenderness:  none   Flexion ROM:  125   Extension ROM:  0   Effusion:  no   DVT Evaluation:  No evidence of DVT seen on physical exam.         Neurological: Patient is alert and oriented to person, place, and time. Normal strenght. No sensory deficit.   Skin: Skin is warm and dry  Psychiatric: Behavior is normal. Thought content normal.  Nursing note and vitals reviewed. Labs and Imaging:     XR taken today:  No results found. No new x-rays are taken today however those from 1/26/2022 demonstrate status post left total knee arthroplasty which appears in excellent alignment without evidence of hardware complication. Assessment and Plan:  1. Status post total left knee replacement    2. Primary osteoarthritis of left knee              PLAN:  This is a 77 y.o. male who presents to the clinic today for follow up 3 months status post left total knee arthroplasty on 1/11/2022. Patient is doing excellent today some expected soreness but no other complaints at this time. 1.  Continue with daily home exercise  2. Refill for meloxicam is provided  3. Patient is again educated on the do's and don'ts of total knee arthroplasties which she is well versed in.  4.  We will see patient back for his 1 year follow-up in January 2023 however would be happy to see him back for any questions or concerns sooner if needed. Please note that this chart was generated using voice recognition Dragon dictation software. Although every effort was made to ensure the accuracy of this automated transcription, some errors in transcription may have occurred.

## 2022-05-16 DIAGNOSIS — Z79.2 PROPHYLACTIC ANTIBIOTIC: Primary | ICD-10-CM

## 2022-05-16 RX ORDER — AMOXICILLIN 500 MG/1
500 CAPSULE ORAL SEE ADMIN INSTRUCTIONS
Qty: 6 CAPSULE | Refills: 0 | Status: SHIPPED | OUTPATIENT
Start: 2022-05-16 | End: 2022-05-26

## 2022-05-16 NOTE — TELEPHONE ENCOUNTER
Patient called, had knee replacement surgery in January, and he is going to his dentist tomorrow afternoon. Patient would like to know if he needs a pre-med for this dental appt. Pharmacy is [facility] on [facility] [address].     Patient can be reached at [phone]

## 2022-08-02 ENCOUNTER — TELEPHONE (OUTPATIENT)
Dept: PHARMACY | Facility: CLINIC | Age: 67
End: 2022-08-02

## 2022-08-02 NOTE — TELEPHONE ENCOUNTER
[facility] CLINICAL [facility]ARMACY: ADHERENCE REVIEW  Identified care gap per [employer]: fills at [facility]: Statin adherence    Last Visit: 3/28/22 Care Everywhere cardiology    Patient not found in Outcomes [facility]    [address] Records claims through 7/17/22 (Prior Year Prakash De La Cruz =  n/a%; YTD Prakash De La Cruz =  80%; Potential Fail Date: 8/20/22 ):   Rosuvastatin 5 mg tablet last filled on 5/13/22 for 30 day supply. Next refill due: 6/26/22    Per Reconciled Dispense Report:  Dispensed Days Supply Quantity Provider Pharmacy    Rosuvastatin Calcium 5 Mg            Tab 05/13/2022 30 8 each [address] .. Rosuvastatin Calcium 5 Mg            Tab 04/20/2022 30 8 each [address] .. Rosuvastatin Calcium 5 Mg            Tab 03/28/2022 30 8 each [address] .. Per CareEverywhere 3/28/22 visit: \"rosuvastatin (CRESTOR) 5 mg tablet   Sig: Take 0.5 tablets (2.5 mg total) by mouth every other day. \"    Lab Results   Component Value Date    CHOL 203 (H) 10/16/2020    TRIG 122 10/16/2020    HDL 47 10/16/2020    LDLCHOLESTEROL 132 (H) 10/16/2020    LDLCALC 145 (H) 03/22/2018     ALT   Date Value Ref Range Status   10/16/2020 20 5 - 41 U/L Final     AST   Date Value Ref Range Status   10/16/2020 16 <40 U/L Final     The 10-year ASCVD risk score (Richard Greene., et al., 2013) is: 16%    Values used to calculate the score:      Age: 77 years      Sex: Male      Is Non- : No      Diabetic: No      Tobacco smoker: No      Systolic Blood Pressure: 258 mmHg      Is BP treated: No      HDL Cholesterol: 47 mg/dL      Total Cholesterol: 203 mg/dL     PLAN  Reached patient to review. Satya Tejada states that the rosuvastatin was stopped. He states he was having side effects and it was not helping his cholesterol. States he's had troubles tolerating statins in the past.  He states he thinks he will be prescribed Repatha next.   No further outreach planned at this time.     Future Appointments   Date Time Provider Dickson Cohen   1/11/2023  8:30 AM Jose Jacobson, PharmD, RPh, [place].   Department, toll free: [phone], option 1    For [address] in place:  No  Gap Closed?: No   Time Spent (min): 15

## 2023-01-31 ENCOUNTER — OFFICE VISIT (OUTPATIENT)
Dept: ORTHOPEDIC SURGERY | Age: 68
End: 2023-01-31
Payer: MEDICARE

## 2023-01-31 VITALS — RESPIRATION RATE: 16 BRPM | HEIGHT: 68 IN | WEIGHT: 170 LBS | BODY MASS INDEX: 25.76 KG/M2

## 2023-01-31 DIAGNOSIS — Z96.652 HISTORY OF TOTAL KNEE ARTHROPLASTY, LEFT: Primary | ICD-10-CM

## 2023-01-31 DIAGNOSIS — M70.61 GREATER TROCHANTERIC BURSITIS OF RIGHT HIP: ICD-10-CM

## 2023-01-31 PROCEDURE — 99214 OFFICE O/P EST MOD 30 MIN: CPT | Performed by: PHYSICIAN ASSISTANT

## 2023-01-31 PROCEDURE — 1123F ACP DISCUSS/DSCN MKR DOCD: CPT | Performed by: PHYSICIAN ASSISTANT

## 2023-01-31 NOTE — PROGRESS NOTES
[address] [facility], 20 [facility] [address], [address] [facility], [address] [facility]           Dept Phone: [phone]           Dept Fax:  [phone] [address] [facility]           [place]          Dept Phone: [phone]           Dept Fax:  [phone]      Chief Compliant:  Chief Complaint   Patient presents with    Knee Pain     SP- L TKA 1/11/22        History of Present Illness:  Milton Ayala returns today. This is a 79 y.o. male who presents to the clinic today for follow up of status post left total knee arthroplasty on 1/11/2022. Patient reports overall his knee is doing quite well does note occasional discomfort and stiffness if he is sitting for extended periods of time but overall feels that the knee is improving. Continues to note intermittent clicking within the knee but this is virtually pain-free. He reports that he has improved with range of motion but still lacks about 5 degrees compared to contralateral knee. He has regained a lot of strength and feels like he has returned to most activities pain-free. Patient does report intermittent right hip pain over the last several weeks localized pain to the lateral hip with occasional radiation anteriorly. States this is most severe at night but not present all the time. Review of Systems   Constitutional: Negative for fever, chills, sweats, recent illness, or recent injury. Neurological: Negative for headaches, numbness, or weakness. Integumentary: Negative for rash, itching, ecchymosis, abrasions, or laceration. Musculoskeletal: Positive for Knee Pain (SP- L TKA 1/11/22)       Physical Exam:  Constitutional: Patient is oriented to person, place, and time. Patient appears well-developed and well nourished.    Musculoskeletal:    Left Knee    Gait:  Normal.   Incision:  {Well healed without any incisional erythema. Tenderness:  none   Flexion ROM:  115   Extension ROM:  0   Effusion:  mild   DVT Evaluation:  No evidence of DVT seen on physical exam.     right Hip    Tenderness: Mild tenderness over the right greater trochanter. Mild tenderness over the proximal IT band. No tenderness to the anterior posterior hip       Range of Motion:      Extension: 10     Flexion: 120     Internal Rotation: 30     External Rotation: 40     Abduction: 40     Adduction:  30       Muscle Strength      Abduction: 5/5     Adduction: 5/5     Flexion: 5/5         Gait: Antalgic   Sb Test: Negative   Demetrius Test: Positive       Neurological: Patient is alert and oriented to person, place, and time. Normal strenght. No sensory deficit. Skin: Skin is warm and dry  Psychiatric: Behavior is normal. Thought content normal.  Nursing note and vitals reviewed. Labs and Imaging:     XR taken today:  No results found. X-rays taken in clinic and preliminarily reviewed by me 1/31/23:   AP bilateral knees standing lateral view of the left knee demonstrates status post left total knee arthroplasty. Components appear in excellent position without evidence of hardware loosening, periprosthetic fracture or other hardware complication. Compared with initial postoperative x-rays on 1/26/2022 no significant change of hardware    Assessment and Plan:  1. History of total knee arthroplasty, left    2. Greater trochanteric bursitis of right hip              PLAN:  This is a 79 y.o. male who presents to the clinic today for follow up status post left total knee arthroplasty. Patient ports the left knee is doing quite well does note occasional stiffness but overall happy with outcome to this point. 1.  Patient does express concern over limited flexion however he has excellent flexion about 115 degrees just lacks about 5 compared to contralateral knee.   He is educated this is very satisfactory given total knee components but likely at the flexion range that he he may be given that he is 1 year out. 2.  Patient with evidence of early trochanteric bursitis we discussed the nature extent of the injury as well as treatment options available to him. At this time patient would benefit from conservative management the form of home exercise program.  We did discuss possibility of trochanteric bursal injection in the future should he fail to respond to home exercises. 3.  We will see patient back in 2 years for his left knee however he may call return sooner for any questions or concerns    Please note that this chart was generated using voice recognition Dragon dictation software. Although every effort was made to ensure the accuracy of this automated transcription, some errors in transcription may have occurred.

## 2023-11-29 ENCOUNTER — OFFICE VISIT (OUTPATIENT)
Dept: ORTHOPEDIC SURGERY | Age: 68
End: 2023-11-29
Payer: MEDICARE

## 2023-11-29 VITALS — WEIGHT: 162 LBS | BODY MASS INDEX: 24.55 KG/M2 | RESPIRATION RATE: 16 BRPM | HEIGHT: 68 IN

## 2023-11-29 DIAGNOSIS — M17.11 PRIMARY OSTEOARTHRITIS OF RIGHT KNEE: ICD-10-CM

## 2023-11-29 DIAGNOSIS — G89.29 CHRONIC PAIN OF RIGHT KNEE: Primary | ICD-10-CM

## 2023-11-29 DIAGNOSIS — M25.561 CHRONIC PAIN OF RIGHT KNEE: Primary | ICD-10-CM

## 2023-11-29 PROCEDURE — 99214 OFFICE O/P EST MOD 30 MIN: CPT | Performed by: PHYSICIAN ASSISTANT

## 2023-11-29 PROCEDURE — 1123F ACP DISCUSS/DSCN MKR DOCD: CPT | Performed by: PHYSICIAN ASSISTANT

## 2023-11-29 PROCEDURE — 20610 DRAIN/INJ JOINT/BURSA W/O US: CPT | Performed by: PHYSICIAN ASSISTANT

## 2023-11-29 RX ORDER — BETAMETHASONE SODIUM PHOSPHATE AND BETAMETHASONE ACETATE 3; 3 MG/ML; MG/ML
12 INJECTION, SUSPENSION INTRA-ARTICULAR; INTRALESIONAL; INTRAMUSCULAR; SOFT TISSUE ONCE
Status: COMPLETED | OUTPATIENT
Start: 2023-11-29 | End: 2023-11-29

## 2023-11-29 RX ORDER — LIDOCAINE HYDROCHLORIDE 10 MG/ML
2 INJECTION, SOLUTION INFILTRATION; PERINEURAL ONCE
Status: COMPLETED | OUTPATIENT
Start: 2023-11-29 | End: 2023-11-29

## 2023-11-29 RX ORDER — BUPIVACAINE HYDROCHLORIDE 2.5 MG/ML
2 INJECTION, SOLUTION INFILTRATION; PERINEURAL ONCE
Status: COMPLETED | OUTPATIENT
Start: 2023-11-29 | End: 2023-11-29

## 2023-11-29 RX ADMIN — LIDOCAINE HYDROCHLORIDE 2 ML: 10 INJECTION, SOLUTION INFILTRATION; PERINEURAL at 15:50

## 2023-11-29 RX ADMIN — BUPIVACAINE HYDROCHLORIDE 5 MG: 2.5 INJECTION, SOLUTION INFILTRATION; PERINEURAL at 15:49

## 2023-11-29 RX ADMIN — BETAMETHASONE SODIUM PHOSPHATE AND BETAMETHASONE ACETATE 12 MG: 3; 3 INJECTION, SUSPENSION INTRA-ARTICULAR; INTRALESIONAL; INTRAMUSCULAR; SOFT TISSUE at 15:49

## 2023-11-30 NOTE — PROGRESS NOTES
[address] [facility], [address] [facility], [facility] [address]           Dept Phone: [phone]           Dept Fax:  [phone] [address]          Dept Phone: [phone]           Dept Fax:  [phone]      Chief Compliant:  Chief Complaint   Patient presents with    Knee Pain     Right knee pain         History of Present Illness: This is a 76 y.o. male who presents to the clinic today for evaluation of chronic right knee pain patient reports history of left total knee arthroplasty which is doing excellent today. He reports right knee pain has progressively worsened over the last 6 months. It is typically only present with any prolonged period of activity. Him and his wife are active walk. She reports that he started noticed the pain anytime he attempts to walk any longer than 1/2 mile to a mile. He is able to complete these desired distance walks but notes quite a bit of pain with it. He denies any new injury or trauma no knee joint warmth, redness, fever or chills. Patient is chronically taking meloxicam but states he does take a 7.5 mg dose as he is cutting his 50 mg tablet in half. No history of injections in this right knee but has responded well to injections in his other knee prior to surgery. .     Past History:    Current Outpatient Medications:     meloxicam (MOBIC) 15 MG tablet, Take 1 tablet by mouth daily, Disp: 90 tablet, Rfl: 1    aspirin EC 81 MG EC tablet, Take 1 tablet by mouth 2 times daily, Disp: 90 tablet, Rfl: 1    COVID-19 Antibody Test KIT, 1 kit by In Vitro route once for 1 dose, Disp: 1 kit, Rfl: 0  Allergies   Allergen Reactions    Pravastatin Other (See Comments)     Myalgias       Social History     Socioeconomic History    Marital status:      Spouse name: Not on file

## 2024-04-03 ENCOUNTER — OFFICE VISIT (OUTPATIENT)
Dept: ORTHOPEDIC SURGERY | Age: 69
End: 2024-04-03
Payer: MEDICARE

## 2024-04-03 VITALS — WEIGHT: 162 LBS | HEIGHT: 68 IN | BODY MASS INDEX: 24.55 KG/M2 | RESPIRATION RATE: 16 BRPM

## 2024-04-03 DIAGNOSIS — M17.11 PRIMARY OSTEOARTHRITIS OF RIGHT KNEE: Primary | ICD-10-CM

## 2024-04-03 PROCEDURE — 1123F ACP DISCUSS/DSCN MKR DOCD: CPT | Performed by: PHYSICIAN ASSISTANT

## 2024-04-03 PROCEDURE — 99213 OFFICE O/P EST LOW 20 MIN: CPT | Performed by: PHYSICIAN ASSISTANT

## 2024-04-03 PROCEDURE — 20610 DRAIN/INJ JOINT/BURSA W/O US: CPT | Performed by: PHYSICIAN ASSISTANT

## 2024-04-03 RX ORDER — LIDOCAINE HYDROCHLORIDE 10 MG/ML
2 INJECTION, SOLUTION INFILTRATION; PERINEURAL ONCE
Status: COMPLETED | OUTPATIENT
Start: 2024-04-03 | End: 2024-04-03

## 2024-04-03 RX ORDER — BETAMETHASONE SODIUM PHOSPHATE AND BETAMETHASONE ACETATE 3; 3 MG/ML; MG/ML
12 INJECTION, SUSPENSION INTRA-ARTICULAR; INTRALESIONAL; INTRAMUSCULAR; SOFT TISSUE ONCE
Status: COMPLETED | OUTPATIENT
Start: 2024-04-03 | End: 2024-04-03

## 2024-04-03 RX ORDER — BUPIVACAINE HYDROCHLORIDE 2.5 MG/ML
2 INJECTION, SOLUTION INFILTRATION; PERINEURAL ONCE
Status: COMPLETED | OUTPATIENT
Start: 2024-04-03 | End: 2024-04-03

## 2024-04-03 RX ADMIN — LIDOCAINE HYDROCHLORIDE 2 ML: 10 INJECTION, SOLUTION INFILTRATION; PERINEURAL at 13:40

## 2024-04-03 RX ADMIN — BETAMETHASONE SODIUM PHOSPHATE AND BETAMETHASONE ACETATE 12 MG: 3; 3 INJECTION, SUSPENSION INTRA-ARTICULAR; INTRALESIONAL; INTRAMUSCULAR; SOFT TISSUE at 13:39

## 2024-04-03 RX ADMIN — BUPIVACAINE HYDROCHLORIDE 5 MG: 2.5 INJECTION, SOLUTION INFILTRATION; PERINEURAL at 13:40

## 2024-04-03 NOTE — PROGRESS NOTES
[facility]                John Bond PA-C            [address], [address]               [place], [place] [address]           Dept Phone: [phone]           Dept Fax:  [phone] [address] [facility]                       [address]           [place], [place] [address]          Dept Phone: [phone]           Dept Fax:  [phone]      Chief Compliant:  Chief Complaint   Patient presents with    Knee Pain     Right knee         History of Present Illness:  This is a 68 y.o. male who presents to the clinic today for evaluation of chronic right knee pain patient reports history of left total knee arthroplasty which is doing excellent today.    Patient underwent corticosteroid injection on 11/29/2024 which did provide moderate relief of pain but has since worn off.  He states he never had resolution of pain but it did improve with injection.  Over the last couple weeks patient has noticed a \"bump\" to the posterior medial aspect of the knee states it was much larger than it currently is but seems to wax and wane with activity especially increased exercise.  States this is causing minimal pain but definitely larger that his contralateral side.    Patient continues to go with home exercise program and meloxicam as well    Past History:    Current Outpatient Medications:     meloxicam (MOBIC) 15 MG tablet, Take 1 tablet by mouth daily, Disp: 90 tablet, Rfl: 1    aspirin EC 81 MG EC tablet, Take 1 tablet by mouth 2 times daily, Disp: 90 tablet, Rfl: 1    COVID-19 Antibody Test KIT, 1 kit by In Vitro route once for 1 dose, Disp: 1 kit, Rfl: 0  Allergies   Allergen Reactions    Pravastatin Other (See Comments)     Myalgias       Social History     Socioeconomic History    Marital status:      Spouse name: Not on file    Number of children: Not on file    Years of education: Not on file    Highest education level: Not on file   Occupational History    Not on

## 2024-04-11 ENCOUNTER — OFFICE VISIT (OUTPATIENT)
Dept: ORTHOPEDIC SURGERY | Age: 69
End: 2024-04-11
Payer: MEDICARE

## 2024-04-11 VITALS — BODY MASS INDEX: 24.56 KG/M2 | HEIGHT: 68 IN | WEIGHT: 162.04 LBS | RESPIRATION RATE: 16 BRPM

## 2024-04-11 DIAGNOSIS — M43.10 ACQUIRED SPONDYLOLISTHESIS: ICD-10-CM

## 2024-04-11 DIAGNOSIS — M54.2 NECK PAIN: Primary | ICD-10-CM

## 2024-04-11 PROCEDURE — 1123F ACP DISCUSS/DSCN MKR DOCD: CPT | Performed by: PHYSICIAN ASSISTANT

## 2024-04-11 PROCEDURE — 99213 OFFICE O/P EST LOW 20 MIN: CPT | Performed by: PHYSICIAN ASSISTANT

## 2024-04-11 NOTE — PROGRESS NOTES
Patient ID: Suleman Rothman is a 68 y.o. male    Chief Compliant:  Chief Complaint   Patient presents with    Neck Pain      HPI:  Patient is a 68 y.o. male who presents to the clinic today for evaluation of neck pain.  Patient with neck pain for the past year he states that this pain goes from the left side of his neck into his left shoulder denies any alleviating or aggravating factors.  He denies any trauma fall or injury associated with this.  He denies numbness tingling or pain in his extremities he states that all his pain is located to the trapezial on the left and neck.  Has tried meloxicam as well as aspirin...        Review of Systems   Constitutional: Negative for fever, chills, sweats.   Eyes: Negative for changes in vision, or pain.   HENT: Negative for ear ache, epistaxis, or sore throat.  Respiratory/Cardio: Negative for Chest pain, palpitations, SOB, or cough.  Gastrointestinal: Negative for abdominal pain, N/V/D.   Genitourinary: Negative for dysuria, frequency, urgency, or hematuria.   Neurological: Negative for headache, numbness, or weakness.   Integumentary: Negative for rash, itching, laceration, or abrasion.   Musculoskeletal: Positive for Neck Pain         Past History:    Current Outpatient Medications:     meloxicam (MOBIC) 15 MG tablet, Take 1 tablet by mouth daily, Disp: 90 tablet, Rfl: 1    aspirin EC 81 MG EC tablet, Take 1 tablet by mouth 2 times daily, Disp: 90 tablet, Rfl: 1    COVID-19 Antibody Test KIT, 1 kit by In Vitro route once for 1 dose, Disp: 1 kit, Rfl: 0  Allergies   Allergen Reactions    Pravastatin Other (See Comments)     Myalgias       Social History     Socioeconomic History    Marital status:      Spouse name: Not on file    Number of children: Not on file    Years of education: Not on file    Highest education level: Not on file   Occupational History    Not on file   Tobacco Use    Smoking status: Never    Smokeless tobacco: Never   Vaping Use    Vaping

## 2024-04-25 ENCOUNTER — HOSPITAL ENCOUNTER (OUTPATIENT)
Dept: PHYSICAL THERAPY | Age: 69
Setting detail: THERAPIES SERIES
Discharge: HOME | End: 2024-04-25
Payer: MEDICARE

## 2024-04-25 PROCEDURE — 97140 MANUAL THERAPY 1/> REGIONS: CPT

## 2024-04-25 PROCEDURE — 97110 THERAPEUTIC EXERCISES: CPT

## 2024-04-25 PROCEDURE — 97161 PT EVAL LOW COMPLEX 20 MIN: CPT

## 2024-04-29 NOTE — FLOWSHEET NOTE
[x] [facility]   Outpatient Rehabilitation & Therapy  [address]  P: [phone]   F: [phone]    Physical Therapy Daily Treatment Note      Date:  2024  Patient Name:  Suleman Rothman    :  1955  MRN: [id number]  Physician: Gudelia Weldon PA-C       Insurance: [employer] Medicare $40 copay. Visits BMN, 4 units max billable DOS   Diagnosis: M54.2 (ICD-10-CM) - Neck pain Rehab Codes: M54.2, M25.60    Onset Date:     Next  Appt: 24  Visit# / total visits: 2/8   Cancels/No Shows: 0/0    Subjective:  : Pt states that he is doing about the same, still with stiffness and discomfort primarily at the L side of his neck. Verbalizes compliance with HEP to date but feels that he's guarding due to discomfort and may be pushing too far. 6-7/10 discomfort with active cervical movement today.  Pain:  [x] Yes  [] No Location: L neck Pain Rating: (0-10 scale) 6-7/10  Pain altered Tx:  [] No  [] Yes  Action:  Comments:    Objective:  Modalities:   Precautions:  Exercises:  Exercise Reps/ Time Weight/ Level Completed  Today Comments   Manual 18'  x MFR to L cervical paraspinals, upper trap, levator. Central PA mobs and L facet glides C3-C7. Cervical distraction and suboccipital release          Upper trap stretch (L) 30'' x 2  x HEP review   Levator stretch (L) 30'' x 2  x HEP review   Cervical rotation SNAGs with pillowcase  10x L/R  x    Sidelying book openers 15x L/R  x    Other:    Specific Instructions for next treatment: Manual as needed to address L cervical/periscapular soft tissue restrictions as well as segmental cervical mobility. General postural stabilization       Assessment:  : First visit after eval today. Reviewed home program with good carryover understanding, just educated pt on performing stretches in smaller ranges to allow for pain free mobilization and without compensation (pt with tendency to lean whole trunk laterally and allow shoulder to rise). Heavy

## 2024-04-30 ENCOUNTER — HOSPITAL ENCOUNTER (OUTPATIENT)
Dept: PHYSICAL THERAPY | Age: 69
Setting detail: THERAPIES SERIES
Discharge: HOME | End: 2024-04-30
Payer: MEDICARE

## 2024-04-30 PROCEDURE — 97140 MANUAL THERAPY 1/> REGIONS: CPT

## 2024-04-30 PROCEDURE — 97110 THERAPEUTIC EXERCISES: CPT

## 2024-05-06 ENCOUNTER — HOSPITAL ENCOUNTER (OUTPATIENT)
Dept: PHYSICAL THERAPY | Age: 69
Setting detail: THERAPIES SERIES
Discharge: HOME | End: 2024-05-06
Payer: MEDICARE

## 2024-05-06 PROCEDURE — 97110 THERAPEUTIC EXERCISES: CPT

## 2024-05-06 PROCEDURE — 97140 MANUAL THERAPY 1/> REGIONS: CPT

## 2024-05-09 ENCOUNTER — HOSPITAL ENCOUNTER (OUTPATIENT)
Dept: PHYSICAL THERAPY | Age: 69
Setting detail: THERAPIES SERIES
Discharge: HOME | End: 2024-05-09
Payer: MEDICARE

## 2024-05-09 PROCEDURE — 97110 THERAPEUTIC EXERCISES: CPT

## 2024-05-09 PROCEDURE — 97140 MANUAL THERAPY 1/> REGIONS: CPT

## 2024-05-09 NOTE — CARE COORDINATION
[x] [facility]   Outpatient Rehabilitation & Therapy  [address]  P: [phone]   F: [phone]    THERAPY RESPONSIBILITY OF CARE TRANSFER FORM       PATIENT NAME: Suleman Rothman  MRN: [id number]   : 1955      TRANSFERRING FACILITY:    [] [place]   [x] [facility]   []  [facility]   [] [facility]   [] [facility]   [] [facility]   [] [facility]    [] Other:       ACCEPTING FACILITY   [] [place]   [x] [facility]   []  [facility]   [] [facility]   [] [facility]   [] [facility]   [] [facility]    [] Other:          REASON FOR TRANSFER: Evaluating PT resignation, effective 5/10/24      TRANSFER OF CARE:    I am transferring the care of the above patient to: Amando Dudley, PT  Kandy Coker, PT  2024      ACCEPTANCE OF CARE:     I am accepting the care of the above patient. Amando Dudley, PT

## 2024-05-09 NOTE — FLOWSHEET NOTE
[x] [facility]   Outpatient Rehabilitation & Therapy  [address]  P: [phone]   F: [phone]    Physical Therapy Daily Treatment Note      Date:  2024  Patient Name:  Suleman Rothman    :  1955  MRN: [id number]  Physician: Gudelia Weldon PA-C       Insurance: [employer] Medicare $40 copay. Visits BMN, 4 units max billable DOS   Diagnosis: M54.2 (ICD-10-CM) - Neck pain Rehab Codes: M54.2, M25.60    Onset Date:     Next  Appt: 24  Visit# / total visits:    Cancels/No Shows: 0/0    Subjective: : Pt states that he feels like he is making progress in therapy. Feels like mobility is a little better and pain did not wake him up last night. Still feels stiff at end ranges of motion, especially L rotation and SB.   Pain:  [x] Yes  [] No Location: L neck Pain Rating: (0-10 scale) 0/10 in neutral position, 5-10 is side bending/rotation  Pain altered Tx:  [] No  [] Yes  Action:  Comments:    Objective:  Modalities:   Precautions:  Exercises:  Exercise Reps/ Time Weight/ Level Completed  Today Comments   Manual 15'  x MFR to L cervical paraspinals, upper trap, levator. Central PA mobs and L facet glides C3-C7. Cervical distraction and suboccipital release   Supine MET to improve L cervical rotation 2'  x Contract/relax cycles working into end range L cervical rotation   Upper trap stretch (L) 30'' x 2  x HEP review   Levator stretch (L) 30'' x 2  x HEP review   Cervical rotation SNAGs with pillowcase  10x L/R  x    Sidelying book openers 15x L/R  x    Cat/cow 3'' hold x 10  x    Cervical Retraction into extension with towel 5\" hold x 10   x           Alternating Horizontal Abduction 10x2 Red x With cervical rotation   Prone Y,T, Row on Total Gym 15x ea   Performed Bilaterally    Other:    Specific Instructions for next treatment: Manual as needed to address L cervical/periscapular soft tissue restrictions as well as segmental cervical mobility. General postural stabilization

## 2024-05-13 ENCOUNTER — APPOINTMENT (OUTPATIENT)
Dept: PHYSICAL THERAPY | Age: 69
End: 2024-05-13
Payer: MEDICARE

## 2024-05-15 ENCOUNTER — HOSPITAL ENCOUNTER (OUTPATIENT)
Dept: PHYSICAL THERAPY | Age: 69
Setting detail: THERAPIES SERIES
Discharge: HOME | End: 2024-05-15
Payer: MEDICARE

## 2024-05-15 PROCEDURE — 97140 MANUAL THERAPY 1/> REGIONS: CPT

## 2024-05-15 PROCEDURE — 97110 THERAPEUTIC EXERCISES: CPT

## 2024-05-15 NOTE — FLOWSHEET NOTE
Continued to focus on cervical mobility with manual interventions and exercises. Minimal palpable muscle tension through targeted areas. Consequently, less time spent on STM this date. Initial cues required for cat/cow and towel exercises with patient demonstrating improved technique following cues. No pain with cervical rotation when assessed at end of session today.      [x] Progressing toward goals.    [] No change.     [] Other:    [x] Patient would continue to benefit from skilled physical therapy services in order to: help modulate pain, improve cervical motion and optimize stability to cervical spine for improved capacity through all daily activities with minimal limitation     LTG: (to be met in 8 treatments)  Pt will self report worst pain no greater than 3/10 in order to better tolerate ADLs with minimal dysfunction  Pt will improve AROM in cervical spine to 90% range in all planes without increased pain in order to demonstrate ability to move/reach in all planes unrestricted at PLOF  Pt will demonstrate improved B UE strength to 4+/5 or greater in order to demonstrate improved stability/strength necessary for unrestricted ADLs/community activities  Pt will decrease NDI to 8% impaired or less in order to demonstrate improved functional tolerances at PLOF with minimal restriction/dysfunction  Pt will demonstrate independence with a long term HEP for continued progress/maintenance after completion of PT    Pt. Education:  [x] Yes  [] No  [x] Reviewed Prior HEP/Ed  Method of Education: [x] Verbal  [] Demo  [] Written  Comprehension of Education:  [x] Verbalizes understanding.  [x] Demonstrates understanding.  [] Needs review.  [] Demonstrates/verbalizes HEP/Ed previously given.     Plan: [x] Continue per plan of care.   [] Other:      Treatment Charges: Mins Units   []  Modalities     [x]  Ther Exercise 32 2   [x]  Manual Therapy 10 1   []  Ther Activities     []  Aquatics     []  Neuromuscular     []

## 2024-05-20 ENCOUNTER — APPOINTMENT (OUTPATIENT)
Dept: PHYSICAL THERAPY | Age: 69
End: 2024-05-20
Payer: MEDICARE

## 2024-05-22 ENCOUNTER — HOSPITAL ENCOUNTER (OUTPATIENT)
Dept: PHYSICAL THERAPY | Age: 69
Setting detail: THERAPIES SERIES
Discharge: HOME | End: 2024-05-22
Payer: MEDICARE

## 2024-05-22 PROCEDURE — 97140 MANUAL THERAPY 1/> REGIONS: CPT

## 2024-05-22 PROCEDURE — 97110 THERAPEUTIC EXERCISES: CPT

## 2024-05-22 NOTE — FLOWSHEET NOTE
[x] [facility]   Outpatient Rehabilitation & Therapy  [address]  P: [phone]   F: [phone]    Physical Therapy Daily Treatment Note      Date:  2024  Patient Name:  Suleman Rothman    :  1955  MRN: [id number]  Physician: Gudelia Weldon PA-C       Insurance: [employer] Medicare $40 copay. Visits BMN, 4 units max billable DOS   Diagnosis: M54.2 (ICD-10-CM) - Neck pain Rehab Codes: M54.2, M25.60    Onset Date:     Next  Appt: 24  Visit# / total visits:    Cancels/No Shows: 0/0    Subjective: : Pt reports pain in cervical spine has been improving, pain is currently a 0/10 at rest and a 5/10 with active movement. No complaints from previous treatment session.     Pain:  [x] Yes  [] No Location: L neck Pain Rating: (0-10 scale) 0/10 in neutral position, 5 in side bending/rotation  Pain altered Tx:  [] No  [] Yes  Action:  Comments:    Objective:  Modalities:   Precautions:  Exercises:  Exercise Reps/ Time Weight/ Level Completed  Today Comments   Manual 10'  x MFR to L cervical paraspinals, upper trap, levator. Central PA mobs and L facet glides C3-C7. Cervical distraction and suboccipital release   Supine MET to improve L cervical rotation 2'  x Contract/relax cycles working into end range L cervical rotation   Upper trap stretch (L) 30'' x 2   HEP review   Levator stretch (L) 30'' x 2   HEP review   Cervical rotation SNAGs with pillowcase  10x L/R  x    Sidelying book openers 15x L/R  x    Cat/cow 3'' hold x 10  x    Cervical Retraction into extension with towel 5\" hold x 10   x    Scapular Retraction  10 x 3\"hold  x Added    Alternating Horizontal Abduction 10x2 Red x Verbal cue for cervical rotation   Tband Diagonals  10x ea Red x    Prone Y,T, Row on Total Gym 15x ea 1# x Performed Bilaterally   Added 1# to Y,T and 3# to Row    Other:    Specific Instructions for next treatment: Manual as needed to address L cervical/periscapular soft tissue

## 2024-05-23 ENCOUNTER — OFFICE VISIT (OUTPATIENT)
Dept: ORTHOPEDIC SURGERY | Age: 69
End: 2024-05-23
Payer: MEDICARE

## 2024-05-23 VITALS — HEIGHT: 68 IN | RESPIRATION RATE: 16 BRPM | WEIGHT: 162.04 LBS | BODY MASS INDEX: 24.56 KG/M2

## 2024-05-23 DIAGNOSIS — M54.2 NECK PAIN: ICD-10-CM

## 2024-05-23 DIAGNOSIS — M43.10 ACQUIRED SPONDYLOLISTHESIS: Primary | ICD-10-CM

## 2024-05-23 PROCEDURE — 99213 OFFICE O/P EST LOW 20 MIN: CPT | Performed by: PHYSICIAN ASSISTANT

## 2024-05-23 PROCEDURE — 1123F ACP DISCUSS/DSCN MKR DOCD: CPT | Performed by: PHYSICIAN ASSISTANT

## 2024-05-23 NOTE — PROGRESS NOTES
Patient ID: Suleman Rothman is a 68 y.o. male    Chief Compliant:  Chief Complaint   Patient presents with    Neck Pain      HPI:  Patient is a 68 y.o. male who presents to the clinic today for 6-week follow-up of neck pain has been there for 1 year he has got left-sided neck pain with left parascapular trapezial pain.  Last time his imaging demonstrated some multilevel degenerative changes with spondylolisthesis at C4-5 and retrolisthesis at C3-4.  On his last visit we sent him for physical therapy which she completed about 6 weeks worth patient states that the physical therapy improved his pain is left-sided neck pain has improved significantly and it is tolerable at this point.        Review of Systems   Constitutional: Negative for fever, chills, sweats.   Eyes: Negative for changes in vision, or pain.   HENT: Negative for ear ache, epistaxis, or sore throat.  Respiratory/Cardio: Negative for Chest pain, palpitations, SOB, or cough.  Gastrointestinal: Negative for abdominal pain, N/V/D.   Genitourinary: Negative for dysuria, frequency, urgency, or hematuria.   Neurological: Negative for headache, numbness, or weakness.   Integumentary: Negative for rash, itching, laceration, or abrasion.   Musculoskeletal: Positive for Neck Pain         Past History:    Current Outpatient Medications:     meloxicam (MOBIC) 15 MG tablet, Take 1 tablet by mouth daily, Disp: 90 tablet, Rfl: 1    aspirin EC 81 MG EC tablet, Take 1 tablet by mouth 2 times daily, Disp: 90 tablet, Rfl: 1    COVID-19 Antibody Test KIT, 1 kit by In Vitro route once for 1 dose, Disp: 1 kit, Rfl: 0  Allergies   Allergen Reactions    Pravastatin Other (See Comments)     Myalgias       Social History     Socioeconomic History    Marital status:      Spouse name: Not on file    Number of children: Not on file    Years of education: Not on file    Highest education level: Not on file   Occupational History    Not on file   Tobacco Use    Smoking

## 2024-05-30 ENCOUNTER — HOSPITAL ENCOUNTER (OUTPATIENT)
Dept: PHYSICAL THERAPY | Age: 69
Setting detail: THERAPIES SERIES
Discharge: HOME | End: 2024-05-30
Payer: MEDICARE

## 2024-05-30 PROCEDURE — 97140 MANUAL THERAPY 1/> REGIONS: CPT

## 2024-05-30 PROCEDURE — 97110 THERAPEUTIC EXERCISES: CPT

## 2024-05-30 NOTE — FLOWSHEET NOTE
mobility. Progress general postural stabilization as tolerated.      Assessment:    [x] Progressing toward goals. Initiated session with manual interventions/AAROM exercises to work on pain free cervical mobility followed by exercises for cervical/postural strengthening. Increased resistance with tband exercises to increase strengthening potential of exercise. Patient required intermittent cueing to improve exercise sequencing/posture, but overall good demonstration of exercises performed today.       [] No change.     [] Other:    [x] Patient would continue to benefit from skilled physical therapy services in order to: help modulate pain, improve cervical motion and optimize stability to cervical spine for improved capacity through all daily activities with minimal limitation     LTG: (to be met in 8 treatments)  Pt will self report worst pain no greater than 3/10 in order to better tolerate ADLs with minimal dysfunction  Pt will improve AROM in cervical spine to 90% range in all planes without increased pain in order to demonstrate ability to move/reach in all planes unrestricted at PLOF  Pt will demonstrate improved B UE strength to 4+/5 or greater in order to demonstrate improved stability/strength necessary for unrestricted ADLs/community activities  Pt will decrease NDI to 8% impaired or less in order to demonstrate improved functional tolerances at PLOF with minimal restriction/dysfunction  Pt will demonstrate independence with a long term HEP for continued progress/maintenance after completion of PT    Pt. Education:  [x] Yes  [] No  [] Reviewed Prior HEP/Ed  Method of Education: [x] Verbal  [x] Demo  [] Written  Comprehension of Education:  [x] Verbalizes understanding.  [x] Demonstrates understanding.  [] Needs review.  [] Demonstrates/verbalizes HEP/Ed previously given.     Plan: [x] Continue per plan of care.   [] Other:      Treatment Charges: Mins Units   []  Modalities     [x]  Ther Exercise 30 2

## 2024-06-05 ENCOUNTER — HOSPITAL ENCOUNTER (OUTPATIENT)
Dept: PHYSICAL THERAPY | Age: 69
Setting detail: THERAPIES SERIES
Discharge: HOME | End: 2024-06-05
Payer: MEDICARE

## 2024-06-05 PROCEDURE — 97110 THERAPEUTIC EXERCISES: CPT

## 2024-06-05 PROCEDURE — 97140 MANUAL THERAPY 1/> REGIONS: CPT

## 2024-06-05 NOTE — PROGRESS NOTES
[x] [facility]   Outpatient Rehabilitation & Therapy  [address]  P: [phone]   F: [phone]    Physical Therapy Daily Treatment Note      Date:  2024  Patient Name:  Suleman Rothman    :  1955  MRN: [id number]  Physician: Gudelia Weldon PA-C       Insurance: [employer] $40 copay. Visits BMN, 4 units max billable DOS   Diagnosis: M54.2 (ICD-10-CM) - Neck pain Rehab Codes: M54.2, M25.60    Onset Date:     Next  Appt: 24  Visit# / total visits:    Cancels/No Shows: 0/0    Subjective: : Patient reports pain continues to improve.  Pt reports pain free when looking straight ahead but pain increase with movement    Pain:  [x] Yes  [] No Location: L neck Pain Rating: (0-10 scale) 0/10 in neutral position, 3 in side bending/rotation  Pain altered Tx:  [x] No  [] Yes  Action:  Comments:    Objective:  Modalities:   Precautions:  NONE  Exercises:  Exercise Reps/ Time Weight/ Level Completed  Today Comments   Manual 10'  x MFR to L cervical paraspinals, upper trap, levator. Central PA mobs and L facet glides C3-C7. Cervical distraction and suboccipital release   Supine MET to improve L cervical rotation 2'   Contract/relax cycles working into end range L cervical rotation   Upper trap stretch (L) 30'' x 2  x HEP review   Levator stretch (L) 30'' x 2  x HEP review   Cervical rotation SNAGs with pillowcase  10x L/R  x    Sidelying book openers 15x L/R      Cat/cow 3'' hold x 10      Cervical Retraction into extension with towel 5\" hold x 10   x    Scapular Retraction  10 x 3\"hold  x Added    Alternating Horizontal Abduction 10x2 Blue x Verbal cue for cervical rotation   Tband Diagonals  10x ea Blue x    Prone Y,T, Row on Total Gym 15x ea 1# x Performed Bilaterally   Added 1# to Y,T and 3# to Row    Other:    Specific Instructions for next treatment: Manual as needed to address L cervical/periscapular soft tissue restrictions as well as segmental cervical

## 2024-09-04 ENCOUNTER — OFFICE VISIT (OUTPATIENT)
Age: 69
End: 2024-09-04
Payer: MEDICARE

## 2024-09-04 ENCOUNTER — TELEPHONE (OUTPATIENT)
Age: 69
End: 2024-09-04

## 2024-09-04 VITALS
HEIGHT: 68 IN | BODY MASS INDEX: 25.01 KG/M2 | OXYGEN SATURATION: 96 % | HEART RATE: 58 BPM | DIASTOLIC BLOOD PRESSURE: 88 MMHG | WEIGHT: 165 LBS | SYSTOLIC BLOOD PRESSURE: 150 MMHG | RESPIRATION RATE: 16 BRPM

## 2024-09-04 DIAGNOSIS — Z85.46 HISTORY OF PROSTATE CANCER: ICD-10-CM

## 2024-09-04 DIAGNOSIS — K60.3 FISTULA-IN-ANO: Primary | ICD-10-CM

## 2024-09-04 DIAGNOSIS — Z79.01 CHRONIC ANTICOAGULATION: ICD-10-CM

## 2024-09-04 PROCEDURE — 99204 OFFICE O/P NEW MOD 45 MIN: CPT | Performed by: SURGERY

## 2024-09-04 PROCEDURE — 1123F ACP DISCUSS/DSCN MKR DOCD: CPT | Performed by: SURGERY

## 2024-09-04 NOTE — TELEPHONE ENCOUNTER
Called left message for patient to call back and schedule a Colonoscopy with Exam Under Anesthesia.

## 2024-09-04 NOTE — PATIENT INSTRUCTIONS
Our surgery schedulers will contact you to schedule,If you do not hear from our office in 1 week please call us at [phone]          [facility]  Michoacano Matamoros MD, FACS  [place]  [address], [address]  [place] [address]  Phone: [phone]  Fax: [phone]      Miralax (Polyethylene Glycol)  STOP Aspirin 7 Days prior to Procedure  STOP all other Blood Thinners 5 Days prior to Procedure      **DO NOT EAT ANY SOLID FOOD THE DAY BEFORE YOUR PROCEDURE**  **YOU MUST BE ON A CLEAR LIQUID DIET ONLY**    Approved Clear Liquids:  Any flavor of water or soda except Red or Purple  Fruit Juice without pulp  Coffee or tea without dairy products  Jell-O without fruit or toppings, No Red or Purple  Pop-esvin or Italian Ice, No Red or Purple  Chicken or Beef broth and bouillon      DRINK PLENTY OF WATER THROUGHOUT THE DAY    At 10:00 am the day before your procedure, take all 4 Dulcolax Tablets.  At 6:00 pm the day before your procedure, mix the ENTIRE bottle of Miralax (238 gram or Close to it) with 64 ounces of Gatorade, Gatorade Zero or Propel Water (NOT RED or PURPLE).  You must consume the entire 64 ounces by 8:00 pm.  Continue clear liquid diet up until midnight.    NOTHING TO EAT, DRINK, SMOKE, OR CHEW AFTER MIDNIGHT    You may brush your teeth, rinse, gargle, and spit.  Heart or Blood pressure medications ONLY with a small sip of water, unless otherwise directed.  Shower with regular soap and water.    YOU MUST HAVE AN ADULT EITHER DRIVE YOU OR RIDE IN A CAB WITH YOU        [facility]   [address] [facility] [id number]  [facility] [address]  Dept: [phone]  Dept Fax: [phone]         SURGICAL REQUIREMENTS :    - If you have any coverage / billing questions before your surgery please contact your insurance directly.   - As discussed with the surgery scheduler and/ or your

## 2024-09-05 RX ORDER — BISACODYL 5 MG/1
TABLET, DELAYED RELEASE ORAL
Qty: 4 TABLET | Refills: 0 | Status: SHIPPED | OUTPATIENT
Start: 2024-09-05

## 2024-09-05 RX ORDER — ONDANSETRON 4 MG/1
4 TABLET, FILM COATED ORAL EVERY 6 HOURS PRN
Qty: 10 TABLET | Refills: 0 | Status: SHIPPED | OUTPATIENT
Start: 2024-09-05

## 2024-09-05 RX ORDER — POLYETHYLENE GLYCOL 3350 17 G/17G
POWDER, FOR SOLUTION ORAL
Qty: 238 G | Refills: 0 | Status: SHIPPED | OUTPATIENT
Start: 2024-09-05

## 2024-09-05 NOTE — TELEPHONE ENCOUNTER
Patient returned call to schedule a procedure.    TM/SC/ TUESDAY 10/1/2024 AT 10/1/2024 AT 11;15 AM/ JOSEFINAPE W/EUA/EVELYN    Patient instructed to Stop Aspirin 7 days prior to procedure   Stop Meloxicam 5 days prior to procedure    Patient fully instructed /emailed    Orders pending

## 2024-09-07 ASSESSMENT — ENCOUNTER SYMPTOMS
ABDOMINAL PAIN: 0
ROS SKIN COMMENTS: SEE HPI.
RHINORRHEA: 0
COUGH: 0
VOMITING: 0
SHORTNESS OF BREATH: 0
NAUSEA: 0

## 2024-09-23 ENCOUNTER — HOSPITAL ENCOUNTER (OUTPATIENT)
Dept: PREADMISSION TESTING | Age: 69
Discharge: HOME | End: 2024-09-27

## 2024-09-23 VITALS — HEIGHT: 68 IN | BODY MASS INDEX: 24.55 KG/M2 | WEIGHT: 162 LBS

## 2024-09-23 RX ORDER — TADALAFIL 5 MG/1
5 TABLET ORAL PRN
COMMUNITY

## 2024-09-27 NOTE — PRE-PROCEDURE INSTRUCTIONS
No answer, left message ?                             Unable to leave message ?    When were you told to arrive at hospital ? -0915     Do you have a  ?-YES    Are you on any blood thinners ? -ASA                    If yes when did you stop taking ?-9-21-24    Do you have your prep Rx filled and instruction ? -YES     Nothing to eat the day before , only clear liquids.-INSTRUCTED    Are you experiencing any covid symptoms ? -NONE    Do you have any infections or rash we should be aware of ?-NONE      Do you have the Hibiclens soap to use the night before and the morning of surgery ?-N/A    Nothing to eat or drink after midnight, only a sip of water to take any medication instructed to take the night before.-INSTRUCTED    Wear comfortable clothing, leave any valuables at home, remove any jewelry and body piercing .-INSTRUCTED

## 2024-09-30 ENCOUNTER — ANESTHESIA EVENT (OUTPATIENT)
Dept: ENDOSCOPY | Age: 69
End: 2024-09-30
Payer: MEDICARE

## 2024-09-30 ENCOUNTER — TELEPHONE (OUTPATIENT)
Age: 69
End: 2024-09-30

## 2024-10-01 ENCOUNTER — HOSPITAL ENCOUNTER (OUTPATIENT)
Age: 69
Setting detail: OUTPATIENT SURGERY
Discharge: HOME | End: 2024-10-01
Attending: SURGERY | Admitting: SURGERY
Payer: MEDICARE

## 2024-10-01 ENCOUNTER — ANESTHESIA (OUTPATIENT)
Dept: ENDOSCOPY | Age: 69
End: 2024-10-01
Payer: MEDICARE

## 2024-10-01 VITALS
HEART RATE: 81 BPM | HEIGHT: 68 IN | DIASTOLIC BLOOD PRESSURE: 74 MMHG | SYSTOLIC BLOOD PRESSURE: 131 MMHG | TEMPERATURE: 97.2 F | WEIGHT: 162 LBS | RESPIRATION RATE: 16 BRPM | BODY MASS INDEX: 24.55 KG/M2 | OXYGEN SATURATION: 98 %

## 2024-10-01 PROCEDURE — 2709999900 HC NON-CHARGEABLE SUPPLY: Performed by: SURGERY

## 2024-10-01 PROCEDURE — 2580000003 HC RX 258: Performed by: ANESTHESIOLOGY

## 2024-10-01 PROCEDURE — 3700000000 HC ANESTHESIA ATTENDED CARE: Performed by: SURGERY

## 2024-10-01 PROCEDURE — 2500000003 HC RX 250 WO HCPCS: Performed by: ANESTHESIOLOGY

## 2024-10-01 PROCEDURE — 45378 DIAGNOSTIC COLONOSCOPY: CPT | Performed by: SURGERY

## 2024-10-01 PROCEDURE — 7100000011 HC PHASE II RECOVERY - ADDTL 15 MIN: Performed by: SURGERY

## 2024-10-01 PROCEDURE — 7100000001 HC PACU RECOVERY - ADDTL 15 MIN: Performed by: SURGERY

## 2024-10-01 PROCEDURE — 7100000010 HC PHASE II RECOVERY - FIRST 15 MIN: Performed by: SURGERY

## 2024-10-01 PROCEDURE — 7100000000 HC PACU RECOVERY - FIRST 15 MIN: Performed by: SURGERY

## 2024-10-01 PROCEDURE — 6360000002 HC RX W HCPCS: Performed by: NURSE ANESTHETIST, CERTIFIED REGISTERED

## 2024-10-01 PROCEDURE — 7100000031 HC ASPR PHASE II RECOVERY - ADDTL 15 MIN: Performed by: SURGERY

## 2024-10-01 PROCEDURE — 3609027000 HC COLONOSCOPY: Performed by: SURGERY

## 2024-10-01 PROCEDURE — 2500000003 HC RX 250 WO HCPCS: Performed by: NURSE ANESTHETIST, CERTIFIED REGISTERED

## 2024-10-01 PROCEDURE — 7100000030 HC ASPR PHASE II RECOVERY - FIRST 15 MIN: Performed by: SURGERY

## 2024-10-01 PROCEDURE — 3700000001 HC ADD 15 MINUTES (ANESTHESIA): Performed by: SURGERY

## 2024-10-01 RX ORDER — SODIUM CHLORIDE 9 MG/ML
INJECTION, SOLUTION INTRAVENOUS PRN
Status: DISCONTINUED | OUTPATIENT
Start: 2024-10-01 | End: 2024-10-01 | Stop reason: HOSPADM

## 2024-10-01 RX ORDER — EPHEDRINE SULFATE/0.9% NACL/PF 25 MG/5 ML
SYRINGE (ML) INTRAVENOUS
Status: DISCONTINUED | OUTPATIENT
Start: 2024-10-01 | End: 2024-10-01 | Stop reason: SDUPTHER

## 2024-10-01 RX ORDER — SODIUM CHLORIDE 0.9 % (FLUSH) 0.9 %
5-40 SYRINGE (ML) INJECTION EVERY 12 HOURS SCHEDULED
Status: DISCONTINUED | OUTPATIENT
Start: 2024-10-01 | End: 2024-10-01 | Stop reason: HOSPADM

## 2024-10-01 RX ORDER — SODIUM CHLORIDE, SODIUM LACTATE, POTASSIUM CHLORIDE, CALCIUM CHLORIDE 600; 310; 30; 20 MG/100ML; MG/100ML; MG/100ML; MG/100ML
INJECTION, SOLUTION INTRAVENOUS CONTINUOUS
Status: DISCONTINUED | OUTPATIENT
Start: 2024-10-01 | End: 2024-10-01 | Stop reason: HOSPADM

## 2024-10-01 RX ORDER — DEXAMETHASONE SODIUM PHOSPHATE 4 MG/ML
INJECTION, SOLUTION INTRA-ARTICULAR; INTRALESIONAL; INTRAMUSCULAR; INTRAVENOUS; SOFT TISSUE
Status: DISCONTINUED | OUTPATIENT
Start: 2024-10-01 | End: 2024-10-01 | Stop reason: SDUPTHER

## 2024-10-01 RX ORDER — LIDOCAINE HYDROCHLORIDE 10 MG/ML
1 INJECTION, SOLUTION EPIDURAL; INFILTRATION; INTRACAUDAL; PERINEURAL
Status: COMPLETED | OUTPATIENT
Start: 2024-10-01 | End: 2024-10-01

## 2024-10-01 RX ORDER — PROPOFOL 10 MG/ML
INJECTION, EMULSION INTRAVENOUS
Status: DISCONTINUED | OUTPATIENT
Start: 2024-10-01 | End: 2024-10-01 | Stop reason: SDUPTHER

## 2024-10-01 RX ORDER — SODIUM CHLORIDE 0.9 % (FLUSH) 0.9 %
5-40 SYRINGE (ML) INJECTION PRN
Status: DISCONTINUED | OUTPATIENT
Start: 2024-10-01 | End: 2024-10-01 | Stop reason: HOSPADM

## 2024-10-01 RX ORDER — FENTANYL CITRATE 50 UG/ML
INJECTION, SOLUTION INTRAMUSCULAR; INTRAVENOUS
Status: DISCONTINUED | OUTPATIENT
Start: 2024-10-01 | End: 2024-10-01 | Stop reason: SDUPTHER

## 2024-10-01 RX ORDER — LIDOCAINE HYDROCHLORIDE 10 MG/ML
INJECTION, SOLUTION EPIDURAL; INFILTRATION; INTRACAUDAL; PERINEURAL
Status: DISCONTINUED | OUTPATIENT
Start: 2024-10-01 | End: 2024-10-01 | Stop reason: SDUPTHER

## 2024-10-01 RX ORDER — ONDANSETRON 2 MG/ML
INJECTION INTRAMUSCULAR; INTRAVENOUS
Status: DISCONTINUED | OUTPATIENT
Start: 2024-10-01 | End: 2024-10-01 | Stop reason: SDUPTHER

## 2024-10-01 RX ADMIN — PROPOFOL 200 MG: 10 INJECTION, EMULSION INTRAVENOUS at 11:29

## 2024-10-01 RX ADMIN — SODIUM CHLORIDE, POTASSIUM CHLORIDE, SODIUM LACTATE AND CALCIUM CHLORIDE: 600; 310; 30; 20 INJECTION, SOLUTION INTRAVENOUS at 09:23

## 2024-10-01 RX ADMIN — EPHEDRINE SULFATE 5 MG: 5 INJECTION INTRAVENOUS at 11:42

## 2024-10-01 RX ADMIN — FENTANYL CITRATE 100 MCG: 50 INJECTION INTRAMUSCULAR; INTRAVENOUS at 11:28

## 2024-10-01 RX ADMIN — LIDOCAINE HYDROCHLORIDE 50 MG: 10 INJECTION, SOLUTION EPIDURAL; INFILTRATION; INTRACAUDAL; PERINEURAL at 11:29

## 2024-10-01 RX ADMIN — EPHEDRINE SULFATE 10 MG: 5 INJECTION INTRAVENOUS at 11:36

## 2024-10-01 RX ADMIN — ONDANSETRON 4 MG: 2 INJECTION INTRAMUSCULAR; INTRAVENOUS at 11:33

## 2024-10-01 RX ADMIN — EPHEDRINE SULFATE 10 MG: 5 INJECTION INTRAVENOUS at 11:33

## 2024-10-01 RX ADMIN — LIDOCAINE HYDROCHLORIDE 1 ML: 10 INJECTION, SOLUTION EPIDURAL; INFILTRATION; INTRACAUDAL; PERINEURAL at 09:23

## 2024-10-01 RX ADMIN — DEXAMETHASONE SODIUM PHOSPHATE 8 MG: 4 INJECTION INTRA-ARTICULAR; INTRALESIONAL; INTRAMUSCULAR; INTRAVENOUS; SOFT TISSUE at 11:33

## 2024-10-01 ASSESSMENT — ENCOUNTER SYMPTOMS
ABDOMINAL PAIN: 0
SHORTNESS OF BREATH: 0
NAUSEA: 0
SINUS PRESSURE: 0

## 2024-10-01 ASSESSMENT — PAIN - FUNCTIONAL ASSESSMENT
PAIN_FUNCTIONAL_ASSESSMENT: NONE - DENIES PAIN
PAIN_FUNCTIONAL_ASSESSMENT: NONE - DENIES PAIN
PAIN_FUNCTIONAL_ASSESSMENT: CRITICAL CARE PAIN OBSERVATION TOOL (CPOT)

## 2024-10-01 NOTE — OP NOTE
[facility] Surgery   Michoacano Matamoros MD, FACS  Luci Renae, APRN-CNP  [address], [address]  [place] [address]  P: [phone], F: [phone]    PROCEDURE NOTE    DATE OF PROCEDURE: 10/1/2024    SURGEON: Michoacano Matamoros MD    ASSISTANT: None    PREOPERATIVE DIAGNOSIS: Possible perianal fistula    POSTOPERATIVE DIAGNOSIS: No evidence of perianal fistula.  Mild radiation proctitis.  Sigmoid diverticulosis.    OPERATION: Total colonoscopy to cecum with intubation of terminal ileum.  Examination under general anesthesia.    ANESTHESIA: General    ESTIMATED BLOOD LOSS: None    COMPLICATIONS: None     SPECIMENS:  Was Not Obtained    HISTORY: The patient is a 69 y.o. year old male with history of above preop diagnosis.  I recommended colonoscopy with possible biopsy or polypectomy and I explained the risk, benefits, expected outcome, and alternatives to the procedure.  Risks included but are not limited to bleeding, infection, respiratory distress, hypotension, and perforation of the colon and possibility of missing a lesion.  The patient understands and is in agreement.      PROCEDURE: The patient was given IV conscious sedation.  The patient's SPO2 remained above 90% throughout the procedure. Digital rectal exam was normal.  The colonoscope was inserted through the anus into the rectum and advanced under direct vision to the cecum without difficulty.  Terminal ileum was examined for approximately 2 inches.  The prep was good.      Findings:  Terminal ileum: normal    Cecum/Ascending colon: normal    Transverse colon: normal    Descending/Sigmoid colon: abnormal: Sigmoid diverticulosis    Rectum/Anus: examined in normal and retroflexed positions and was abnormal: Mild radiation proctitis somewhat atrophic mucosa.  Hypertrophied anal papilla.  No evidence of proctitis or fistula    Examination under general anesthesia was performed that revealed no obvious evidence of fistula or

## 2024-10-01 NOTE — H&P
HISTORY and PHYSICAL  [facility]       NAME:  Suleman Rothman  MRN: [id number]   YOB: 1955   Date: 10/1/2024   Age: 69 y.o.  Gender: male       COMPLAINT AND PRESENT HISTORY:       Suleman Rothman is 69 y.o.,   male, having a COLONOSCOPY DIAGNOSTIC WITH EXAM UNDER ANESTHESIA   , for hx of: Pre-Op Diagnosis Codes:      * Anal fistula      Prior Colonoscopy was done [facility] 10 yrs ago and has been doing cologuard since.      Denies abdominal pain including bloating/gas.    No changes in bowel habits. Pt said that his stool are thinner in circumference   No diarrhea, constipation, blood in stools, black tarry stools.   Pt denies any  hx of hemorrhoids.  Pt has hx of anal abscess.    No changes in appetite and unintended weight loss. Denies any nausea or vomiting. Pt admits to heartburn, indigestion or acid reflux.   Pt is not  taking anything.   Pt denies Family Hx of colon cancer.    Medical history reviewed:  abnormal EKG.  LBBB. Deviated septum.     Patient voices feeling well today. Denies any recent fever or chills, chest pain/pressure.  Pt reports no  SOB.     Patient has been NPO since midnight. No blood thinners in the past  7 days.(Mobic, aspirin)    Patient states  has completed and followed prescribed prep with clear outcome.      Patient denies any personal or family problems with anesthesia.     Pt states that his last surgery was done LBBB.   Last Echo was done May 2024 Left Ventricle: Left ventricle appears normal in size. Systolic   function is mildly decreased with an ejection fraction of 45-50%.     Cardiac catheterization  1/07/22  Narrative    · 1. Minimal nonobstructive coronary artery disease  · 2. Normal left ventricular systolic function wall motion.  · 3. Normal left heart hemodynamics    RECENT LABS, IMAGING AND TESTING     Lab Results   Component Value Date    WBC 5.7 12/28/2021    RBC 5.12 12/28/2021    HGB 15.3 12/28/2021    HCT 45.4 12/28/2021    MCV 88.7

## 2024-10-01 NOTE — ANESTHESIA POSTPROCEDURE EVALUATION
Department of Anesthesiology  Postprocedure Note    Patient: Suleman Rothman  MRN: [id number]  YOB: 1955  Date of evaluation: 10/1/2024    Procedure Summary       Date: 10/01/24 Room / Location: [place] [address] / [facility]    Anesthesia Start: 1125 Anesthesia Stop: 1206    Procedure: COLONOSCOPY DIAGNOSTIC WITH EXAM UNDER ANESTHESIA (Rectum) Diagnosis:       Anal fistula      (Anal fistula [K60.30])    Surgeons: Michoacano Matamoros MD Responsible Provider: Haley Dan MD    Anesthesia Type: General ASA Status: 3            Anesthesia Type: General    Mars Phase I: Mars Score: 9    Mars Phase II: Mars Score: 10    Anesthesia Post Evaluation    Comments: POST- ANESTHESIA EVALUATION       Pt Name: Suleman Rothman  MRN: [id number]  YOB: 1955  Date of evaluation: 10/1/2024  Time:  3:26 PM      /74   Pulse 81   Temp 97.2 °F (36.2 °C) (Infrared)   Resp 16   Ht 1.727 m (5' 8\")   Wt 73.5 kg (162 lb)   SpO2 98%   BMI 24.63 kg/m²      Consciousness Level  Awake  Cardiopulmonary Status  Stable  Pain Adequately Treated YES  Nausea / Vomiting  NO  Adequate Hydration  YES  Anesthesia Related Complications NONE      Electronically signed by Haley Dan MD on 10/1/2024 at 3:26 PM           No notable events documented.

## 2024-10-01 NOTE — ANESTHESIA PRE PROCEDURE
Department of Anesthesiology  Preprocedure Note       Name:  Suleman Rothman   Age:  69 y.o.  :  1955                                          MRN:  [id number]         Date:  10/1/2024      Surgeon: Surgeon(s):  Michoacano Matamoros MD    Procedure: Procedure(s):  COLONOSCOPY DIAGNOSTIC WITH EXAM UNDER ANESTHESIA    Medications prior to admission:   Prior to Admission medications    Medication Sig Start Date End Date Taking? Authorizing Provider   ondansetron (ZOFRAN) 4 MG tablet Take 1 tablet by mouth every 6 hours as needed for Nausea or Vomiting 24  Yes Luci Renae APRN - CNP   tadalafil (CIALIS) 5 MG tablet Take 1 tablet by mouth as needed    Provider, MD Claudette   bisacodyl (DULCOLAX) 5 MG EC tablet Take 4 tablets at 10:00 am day before procedure  Patient not taking: Reported on 10/1/2024 9/5/24   Luci Renae APRN - CNP   polyethylene glycol (MIRALAX) 17 GM/SCOOP powder Mix 238 grams of Miralax with 64 ounces Gatorade (no red or purple) start drinking at 6:00 pm finish by 8:00 pm all of the prep  Patient not taking: Reported on 10/1/2024 9/5/24   Luci Renae APRN - CNP   meloxicam (MOBIC) 15 MG tablet Take 1 tablet by mouth daily  Patient taking differently: Take 1 tablet by mouth daily as needed 22   John Bond PA   aspirin EC 81 MG EC tablet Take 1 tablet by mouth 2 times daily  Patient taking differently: Take 1 tablet by mouth daily 22   Maicol Steward MD   COVID-19 Antibody Test KIT 1 kit by In Vitro route once for 1 dose 20  Mason Fontana MD       Current medications:    Current Facility-Administered Medications   Medication Dose Route Frequency Provider Last Rate Last Admin   • sodium chloride flush 0.9 % injection 5-40 mL  5-40 mL IntraVENous 2 times per day Bebeto Hernandez MD       • sodium chloride flush 0.9 % injection 5-40 mL  5-40 mL IntraVENous PRN Bebeto Hernandez MD       • 0.9 % sodium chloride infusion   IntraVENous PRN Bebeto Hernandez MD

## 2024-10-21 ENCOUNTER — OFFICE VISIT (OUTPATIENT)
Age: 69
End: 2024-10-21
Payer: MEDICARE

## 2024-10-21 VITALS
SYSTOLIC BLOOD PRESSURE: 137 MMHG | OXYGEN SATURATION: 97 % | HEART RATE: 61 BPM | WEIGHT: 168 LBS | DIASTOLIC BLOOD PRESSURE: 80 MMHG | BODY MASS INDEX: 25.46 KG/M2 | TEMPERATURE: 97.3 F | HEIGHT: 68 IN

## 2024-10-21 DIAGNOSIS — K57.90 DIVERTICULOSIS: ICD-10-CM

## 2024-10-21 DIAGNOSIS — K60.30 FISTULA-IN-ANO: Primary | ICD-10-CM

## 2024-10-21 DIAGNOSIS — Z85.46 HISTORY OF PROSTATE CANCER: ICD-10-CM

## 2024-10-21 DIAGNOSIS — K21.9 GASTROESOPHAGEAL REFLUX DISEASE, UNSPECIFIED WHETHER ESOPHAGITIS PRESENT: ICD-10-CM

## 2024-10-21 PROCEDURE — 99214 OFFICE O/P EST MOD 30 MIN: CPT | Performed by: NURSE PRACTITIONER

## 2024-10-21 PROCEDURE — 1123F ACP DISCUSS/DSCN MKR DOCD: CPT | Performed by: NURSE PRACTITIONER

## 2024-10-21 ASSESSMENT — ENCOUNTER SYMPTOMS
SHORTNESS OF BREATH: 0
RHINORRHEA: 0
COUGH: 0
SORE THROAT: 0

## 2024-10-21 NOTE — PROGRESS NOTES
[facility] General Surgery   Michoacano Matamoros MD, FACS  Luci DAPHNIEGinny Renae, APRN-CNP  [address], [address]  [place] [address]  P: [phone], F: [phone]    General and Robotic Surgery  Endoscopy Follow-Up Visit Note               PATIENT NAME: Suleman Rothman   :  1955   MRN: [id number]   PCP:  Catrachito Perales DO     TODAY'S DATE: 10/21/2024    Chief Complaint   Patient presents with    Follow-up      f/u cscope 10/1 op note states 10 years         HISTORY OF PRESENT ILLNESS: 69 y.o. male presents for follow up on colonoscopy findings completed on 10-1-24. History of fistula in ano. Patient tolerated procedure well, no rectal bleeding or pain noted after procedure.    Findings per Dr. Matamoros's procedure note are reviewed below:  Terminal ileum: normal     Cecum/Ascending colon: normal     Transverse colon: normal     Descending/Sigmoid colon: abnormal: Sigmoid diverticulosis     Rectum/Anus: examined in normal and retroflexed positions and was abnormal: Mild radiation proctitis somewhat atrophic mucosa.  Hypertrophied anal papilla.  No evidence of proctitis or fistula     Examination under general anesthesia was performed that revealed no obvious evidence of fistula or fissure.  No inflammation or drainage noted.    No current drainage or symptoms from the fistula. No family history of colon cancer. Recent heartburn that started about a month before the colonoscopy. Patient states he wants to manage the heartburn without medications first.     PAST MEDICAL HISTORY     Past Medical History:   Diagnosis Date    Abnormal EKG     21    Arthritis     Deviated septum     Eye problem     Right- states was seeing floaters, was told problem with the \"jelly\" of eye    Hearing loss     B/l- hearing aids    Hyperlipidemia     LBBB (left bundle branch block)     Per EKG 21    Left knee DJD     Prostate CA (HCC)     Tx in  w/external beam proton radiation, surgery was not done 
  Constitutional:  Negative for chills and fever.   HENT:  Negative for congestion, rhinorrhea and sore throat.    Respiratory:  Negative for cough and shortness of breath.    Cardiovascular:  Negative for chest pain.   Gastrointestinal:         See HPI   Genitourinary: Negative.    Skin: Negative.        VITALS:  /80 (Site: Left Upper Arm, Position: Sitting)   Pulse 61   Temp 97.3 °F (36.3 °C)   Ht 1.727 m (5' 8\")   Wt 76.2 kg (168 lb)   SpO2 97% Comment: ra  BMI 25.54 kg/m²     ***  Physical Exam  Abdominal:      General: Bowel sounds are normal.      Palpations: Abdomen is soft.      Tenderness: There is no abdominal tenderness.                Data  Lab Results   Component Value Date    WBC 5.7 12/28/2021    HGB 15.3 12/28/2021    HCT 45.4 12/28/2021    MCV 88.7 12/28/2021     12/28/2021     Lab Results   Component Value Date     12/28/2021    K 4.4 12/28/2021     12/28/2021    CO2 27 12/28/2021    BUN 14 12/28/2021    CREATININE 0.76 12/28/2021    GLUCOSE 84 12/28/2021    CALCIUM 9.6 12/28/2021    BILITOT 0.46 10/16/2020    ALKPHOS 45 10/16/2020    AST 16 10/16/2020    ALT 20 10/16/2020    LABGLOM >60 12/28/2021    GFRAA >60 12/28/2021           Radiology Review:    CT Result (most recent):  No results found for this or any previous visit from the past 3650 days.        ASSESSMENT   69 y.o. male s/p colonoscopy with polypectomy, patient tolerated procedure well, no complications noted  Colonoscopy findings reviewed: ***  Surgical pathology reviewed as above and is benign    PLAN  Colonoscopy findings and pathology thoroughly discussed with patient  Patient will be due for next colonoscopy in 3 years or sooner if needed, which will be *** 2027- stressed importance of re-scheduling next colonoscopy  High fiber diet with adequate water intake encouraged to avoid constipation/straining r/t diverticulosis   Will re-schedule f/u appointment in the office approximately 6 months prior to

## (undated) DEVICE — SUTURE STRATAFIX SYMMETRIC PDS + SZ 1 L18IN ABSRB VLT L48MM SXPP1A400

## (undated) DEVICE — SOLUTION IRRIG 3000ML 0.9% SOD CHL USP UROMATIC PLAS CONT

## (undated) DEVICE — 450 ML BOTTLE OF 0.05% CHLORHEXIDINE GLUCONATE IN 99.95% STERILE WATER FOR IRRIGATION, USP AND APPLICATOR.: Brand: IRRISEPT ANTIMICROBIAL WOUND LAVAGE

## (undated) DEVICE — DEFENDO AIR WATER SUCTION AND BIOPSY VALVE KIT FOR  OLYMPUS: Brand: DEFENDO AIR/WATER/SUCTION AND BIOPSY VALVE

## (undated) DEVICE — CLOSURE SKIN FLX NONINVASIVE PRELOC TECHNOLOGY FOR 24IN

## (undated) DEVICE — GLOVE ORTHO 8   MSG9480

## (undated) DEVICE — DRESSING ALGINATE POST OPERATIVE 12X3.5 IN RECT PRIMASEAL

## (undated) DEVICE — INTENDED TO SUPPORT AND MAINTAIN THE POSITION OF AN ANESTHETIZED PATIENT DURING SURGERY: Brand: HERMANTOR XL PINK KNEE POSITIONING PAD

## (undated) DEVICE — KIT CLN UP LIN W/ STD SAHARA TBL SHT 40X60IN DRAW/LIFT SHT

## (undated) DEVICE — GLOVE ORANGE PI 7 1/2   MSG9075

## (undated) DEVICE — SUTURE STRATAFIX SPRL MCRYL + SZ 2 0 L27IN ABSRB UD W NDL SXMP1B419

## (undated) DEVICE — 2108 SERIES SAGITTAL BLADE FLARED, GROUND  (29.0 X 1.32 X 84.0MM)

## (undated) DEVICE — SUTURE VCRL SZ 0 L36IN ABSRB UD CT-1 L36MM 1/2 CIR TAPR PNT VCP946H

## (undated) DEVICE — BLANKET WRM W29.9XL79.1IN UP BODY FORC AIR MISTRAL-AIR

## (undated) DEVICE — STERILE PATIENT PROTECTIVE PAD FOR IMP® KNEE POSITIONERS & COHESIVE WRAP (10 / CASE): Brand: DE MAYO KNEE POSITIONER®

## (undated) DEVICE — ENDO KIT W/SYRINGE: Brand: MEDLINE INDUSTRIES, INC.

## (undated) DEVICE — BANDAGE COBAN 4 IN COMPR W4INXL5YD FOAM COHESIVE QUIK STK SELF ADH SFT

## (undated) DEVICE — Device

## (undated) DEVICE — NEPTUNE E-SEP SMOKE EVACUATION PENCIL, COATED, 70MM BLADE, PUSH BUTTON SWITCH: Brand: NEPTUNE E-SEP

## (undated) DEVICE — MERCY HEALTH ST CHARLES: Brand: MEDLINE INDUSTRIES, INC.

## (undated) DEVICE — CEMENT MIXING SYSTEM WITH FEMORAL BREAKWAY NOZZLE: Brand: REVOLUTION

## (undated) DEVICE — 4-PORT MANIFOLD: Brand: NEPTUNE 2

## (undated) DEVICE — KIT AUTOTRNS APPL AERO 2 SET SYR 2 TIP FOR PLT SEP SYS GPS

## (undated) DEVICE — GLOVE ORANGE PI 8 1/2   MSG9085

## (undated) DEVICE — MASTISOL ADHESIVE LIQ 2/3ML

## (undated) DEVICE — COOLER THER 20-31IN L CRYO COMB W/ PD KNEE TB GRAV FLO

## (undated) DEVICE — DRESSING PETRO W3XL8IN OIL EMUL N ADH GZ KNIT IMPREG CELOS

## (undated) DEVICE — KIT SEP W/ BLD DRAW TB SYR NDL TRNQT PD

## (undated) DEVICE — DUAL CUT SAGITTAL BLADE